# Patient Record
Sex: MALE | Race: WHITE | NOT HISPANIC OR LATINO | Employment: PART TIME | URBAN - METROPOLITAN AREA
[De-identification: names, ages, dates, MRNs, and addresses within clinical notes are randomized per-mention and may not be internally consistent; named-entity substitution may affect disease eponyms.]

---

## 2023-05-11 DIAGNOSIS — M54.40 LOW BACK PAIN WITH SCIATICA, SCIATICA LATERALITY UNSPECIFIED, UNSPECIFIED BACK PAIN LATERALITY, UNSPECIFIED CHRONICITY: Primary | ICD-10-CM

## 2023-05-11 RX ORDER — GABAPENTIN 300 MG/1
300 CAPSULE ORAL NIGHTLY
Qty: 30 CAPSULE | Refills: 0 | Status: SHIPPED | OUTPATIENT
Start: 2023-05-11 | End: 2023-06-02 | Stop reason: SDUPTHER

## 2023-05-11 RX ORDER — GABAPENTIN 300 MG/1
300 CAPSULE ORAL NIGHTLY
COMMUNITY
Start: 2021-10-11 | End: 2023-05-11 | Stop reason: SDUPTHER

## 2023-05-17 PROBLEM — R06.2 WHEEZING: Status: ACTIVE | Noted: 2023-05-17

## 2023-05-17 PROBLEM — E78.00 LOW-DENSITY-LIPOID-TYPE (LDL) HYPERLIPOPROTEINEMIA: Status: ACTIVE | Noted: 2023-05-17

## 2023-05-17 PROBLEM — M54.50 LOWER BACK PAIN: Status: ACTIVE | Noted: 2023-05-17

## 2023-05-17 PROBLEM — K22.70 BARRETT'S ESOPHAGUS WITHOUT DYSPLASIA: Status: ACTIVE | Noted: 2023-05-17

## 2023-05-17 PROBLEM — I20.0 UNSTABLE ANGINA (MULTI): Status: ACTIVE | Noted: 2023-05-17

## 2023-05-17 PROBLEM — E78.5 DYSLIPIDEMIA: Status: ACTIVE | Noted: 2023-05-17

## 2023-05-17 PROBLEM — B02.9 SHINGLES: Status: RESOLVED | Noted: 2023-05-17 | Resolved: 2023-05-17

## 2023-05-17 PROBLEM — H61.21 IMPACTED CERUMEN OF RIGHT EAR: Status: ACTIVE | Noted: 2023-05-17

## 2023-05-17 PROBLEM — J30.9 ALLERGIC RHINITIS: Status: ACTIVE | Noted: 2023-05-17

## 2023-05-17 PROBLEM — G62.9 NEUROPATHY: Status: ACTIVE | Noted: 2023-05-17

## 2023-05-17 PROBLEM — R94.31 ABNORMAL EKG: Status: ACTIVE | Noted: 2023-05-17

## 2023-05-17 PROBLEM — R53.83 FATIGUE: Status: ACTIVE | Noted: 2023-05-17

## 2023-05-17 PROBLEM — U07.1 COVID-19: Status: RESOLVED | Noted: 2023-05-17 | Resolved: 2023-05-17

## 2023-05-17 PROBLEM — N20.0 BILATERAL NEPHROLITHIASIS: Status: ACTIVE | Noted: 2023-05-17

## 2023-05-17 PROBLEM — R00.2 PALPITATIONS: Status: ACTIVE | Noted: 2023-05-17

## 2023-05-17 PROBLEM — M25.532 LEFT WRIST PAIN: Status: ACTIVE | Noted: 2023-05-17

## 2023-05-17 PROBLEM — R73.01 ABNORMAL FASTING GLUCOSE: Status: ACTIVE | Noted: 2023-05-17

## 2023-05-17 PROBLEM — S67.21XA: Status: RESOLVED | Noted: 2023-05-17 | Resolved: 2023-05-17

## 2023-05-17 PROBLEM — M19.90 ARTHRITIS: Status: ACTIVE | Noted: 2023-05-17

## 2023-05-17 PROBLEM — E55.9 VITAMIN D DEFICIENCY: Status: ACTIVE | Noted: 2023-05-17

## 2023-05-17 PROBLEM — I10 HYPERTENSION: Status: ACTIVE | Noted: 2023-05-17

## 2023-05-17 PROBLEM — G56.00 CARPAL TUNNEL SYNDROME: Status: ACTIVE | Noted: 2023-05-17

## 2023-05-17 PROBLEM — N20.1 LEFT URETERAL CALCULUS: Status: ACTIVE | Noted: 2023-05-17

## 2023-05-17 PROBLEM — S60.221A CONTUSION OF RIGHT HAND: Status: RESOLVED | Noted: 2023-05-17 | Resolved: 2023-05-17

## 2023-05-17 RX ORDER — EZETIMIBE 10 MG/1
10 TABLET ORAL DAILY
COMMUNITY
End: 2024-03-18 | Stop reason: SDUPTHER

## 2023-05-17 RX ORDER — OMEPRAZOLE 40 MG/1
40 CAPSULE, DELAYED RELEASE ORAL 2 TIMES DAILY
COMMUNITY
End: 2023-12-07 | Stop reason: SDUPTHER

## 2023-05-17 RX ORDER — LISINOPRIL 5 MG/1
5 TABLET ORAL DAILY
COMMUNITY
End: 2023-07-28 | Stop reason: SDUPTHER

## 2023-05-17 RX ORDER — NITROGLYCERIN 0.4 MG/1
0.4 TABLET SUBLINGUAL
COMMUNITY
Start: 2022-09-30

## 2023-05-22 LAB
ALANINE AMINOTRANSFERASE (SGPT) (U/L) IN SER/PLAS: 15 U/L (ref 10–52)
ALBUMIN (G/DL) IN SER/PLAS: 4.2 G/DL (ref 3.4–5)
ALKALINE PHOSPHATASE (U/L) IN SER/PLAS: 59 U/L (ref 33–136)
ANION GAP IN SER/PLAS: 11 MMOL/L (ref 10–20)
ASPARTATE AMINOTRANSFERASE (SGOT) (U/L) IN SER/PLAS: 18 U/L (ref 9–39)
BILIRUBIN TOTAL (MG/DL) IN SER/PLAS: 0.5 MG/DL (ref 0–1.2)
CALCIUM (MG/DL) IN SER/PLAS: 9 MG/DL (ref 8.6–10.3)
CARBON DIOXIDE, TOTAL (MMOL/L) IN SER/PLAS: 28 MMOL/L (ref 21–32)
CHLORIDE (MMOL/L) IN SER/PLAS: 107 MMOL/L (ref 98–107)
CHOLESTEROL (MG/DL) IN SER/PLAS: 199 MG/DL (ref 0–199)
CHOLESTEROL IN HDL (MG/DL) IN SER/PLAS: 59.2 MG/DL
CHOLESTEROL/HDL RATIO: 3.4
CREATININE (MG/DL) IN SER/PLAS: 0.9 MG/DL (ref 0.5–1.3)
ESTIMATED AVERAGE GLUCOSE FOR HBA1C: 126 MG/DL
GFR MALE: >90 ML/MIN/1.73M2
GLUCOSE (MG/DL) IN SER/PLAS: 91 MG/DL (ref 74–99)
HEMOGLOBIN A1C/HEMOGLOBIN TOTAL IN BLOOD: 6 %
LDL: 120 MG/DL (ref 0–99)
POTASSIUM (MMOL/L) IN SER/PLAS: 4.3 MMOL/L (ref 3.5–5.3)
PROTEIN TOTAL: 6.3 G/DL (ref 6.4–8.2)
SODIUM (MMOL/L) IN SER/PLAS: 142 MMOL/L (ref 136–145)
TRIGLYCERIDE (MG/DL) IN SER/PLAS: 98 MG/DL (ref 0–149)
UREA NITROGEN (MG/DL) IN SER/PLAS: 23 MG/DL (ref 6–23)
VLDL: 20 MG/DL (ref 0–40)

## 2023-06-02 ENCOUNTER — OFFICE VISIT (OUTPATIENT)
Dept: PRIMARY CARE | Facility: CLINIC | Age: 68
End: 2023-06-02
Payer: MEDICARE

## 2023-06-02 VITALS
BODY MASS INDEX: 22.22 KG/M2 | DIASTOLIC BLOOD PRESSURE: 66 MMHG | HEIGHT: 68 IN | HEART RATE: 78 BPM | OXYGEN SATURATION: 97 % | WEIGHT: 146.6 LBS | RESPIRATION RATE: 18 BRPM | SYSTOLIC BLOOD PRESSURE: 122 MMHG

## 2023-06-02 DIAGNOSIS — E55.9 VITAMIN D DEFICIENCY: ICD-10-CM

## 2023-06-02 DIAGNOSIS — Z13.29 SCREENING FOR HYPOTHYROIDISM: ICD-10-CM

## 2023-06-02 DIAGNOSIS — Z12.5 SCREENING PSA (PROSTATE SPECIFIC ANTIGEN): ICD-10-CM

## 2023-06-02 DIAGNOSIS — E53.8 VITAMIN B 12 DEFICIENCY: ICD-10-CM

## 2023-06-02 DIAGNOSIS — R73.03 PREDIABETES: ICD-10-CM

## 2023-06-02 DIAGNOSIS — I10 PRIMARY HYPERTENSION: ICD-10-CM

## 2023-06-02 DIAGNOSIS — M54.40 LOW BACK PAIN WITH SCIATICA, SCIATICA LATERALITY UNSPECIFIED, UNSPECIFIED BACK PAIN LATERALITY, UNSPECIFIED CHRONICITY: Primary | ICD-10-CM

## 2023-06-02 DIAGNOSIS — E78.5 DYSLIPIDEMIA: ICD-10-CM

## 2023-06-02 PROCEDURE — 1160F RVW MEDS BY RX/DR IN RCRD: CPT

## 2023-06-02 PROCEDURE — 3074F SYST BP LT 130 MM HG: CPT

## 2023-06-02 PROCEDURE — 1159F MED LIST DOCD IN RCRD: CPT

## 2023-06-02 PROCEDURE — 99214 OFFICE O/P EST MOD 30 MIN: CPT

## 2023-06-02 PROCEDURE — 3078F DIAST BP <80 MM HG: CPT

## 2023-06-02 PROCEDURE — 1036F TOBACCO NON-USER: CPT

## 2023-06-02 RX ORDER — GABAPENTIN 300 MG/1
300 CAPSULE ORAL NIGHTLY
Qty: 90 CAPSULE | Refills: 1 | Status: SHIPPED | OUTPATIENT
Start: 2023-06-02 | End: 2023-12-07 | Stop reason: SDUPTHER

## 2023-06-02 ASSESSMENT — ENCOUNTER SYMPTOMS
ENDOCRINE NEGATIVE: 1
PSYCHIATRIC NEGATIVE: 1
RESPIRATORY NEGATIVE: 1
GASTROINTESTINAL NEGATIVE: 1
MUSCULOSKELETAL NEGATIVE: 1
ALLERGIC/IMMUNOLOGIC NEGATIVE: 1
CONSTITUTIONAL NEGATIVE: 1
NEUROLOGICAL NEGATIVE: 1
EYES NEGATIVE: 1
HEMATOLOGIC/LYMPHATIC NEGATIVE: 1
CARDIOVASCULAR NEGATIVE: 1

## 2023-06-02 ASSESSMENT — PATIENT HEALTH QUESTIONNAIRE - PHQ9
SUM OF ALL RESPONSES TO PHQ9 QUESTIONS 1 AND 2: 0
1. LITTLE INTEREST OR PLEASURE IN DOING THINGS: NOT AT ALL
2. FEELING DOWN, DEPRESSED OR HOPELESS: NOT AT ALL

## 2023-06-02 NOTE — PROGRESS NOTES
3 month follow up - genaro patient.     Patient states he usually goes 6 months between appts.      No health concerns.   He thinks he needs refill on gabapentin today.

## 2023-06-02 NOTE — PATIENT INSTRUCTIONS
6 month follow up medicare wellness with fasting labs.    Follow up with cardiology as scheduled.     Discuss screening exams at next appointment.

## 2023-06-02 NOTE — PROGRESS NOTES
"Subjective   Patient ID: Eugene Patrick is a 68 y.o. male who presents for Follow-up.    HPI a 68-year-old male with past medical history of hyperlipidemia, chronic neuropathy, hypertension, pseudoaneurysms and history of cardiovascular disease and myocardial infarctions in the family arrives to the clinic with chief complaint of 3-month follow-up.  At his last appointment, he was given directions by his previous PCP to complete blood work and to establish with his new PCP today.  He reports that he completed his blood work, like to review them today.  Other than this, the patient would like to let us know that he completed his colonoscopy and EGD at the beginning of this year with no abnormal results.  He completes an EGD every 3 years due to his history of being a  and high risk for esophageal cancer due to smoking inhalation.  Other than this, he does use gabapentin 300 mg oral tablet nightly for his neuropathy and needs a refill on this.  The patient also follows cardiology for his family history of cardiovascular disease and personal history of hypertension, and cholesterol.  He underwent a left and right heart catheterization this year with unremarkable results.  Patient denies any chest pain, shortness of breath, hip pain, fevers, chills, COVID-like symptoms.    Review of Systems   Constitutional: Negative.    HENT: Negative.     Eyes: Negative.    Respiratory: Negative.     Cardiovascular: Negative.    Gastrointestinal: Negative.    Endocrine: Negative.    Genitourinary: Negative.    Musculoskeletal: Negative.    Skin: Negative.    Allergic/Immunologic: Negative.    Neurological: Negative.    Hematological: Negative.    Psychiatric/Behavioral: Negative.     All other systems reviewed and are negative.      Objective   /66 (BP Location: Right arm, BP Cuff Size: Adult)   Pulse 78   Resp 18   Ht 1.727 m (5' 8\")   Wt 66.5 kg (146 lb 9.6 oz)   SpO2 97%   BMI 22.29 kg/m²     Physical " Exam  Vitals and nursing note reviewed.   Constitutional:       Appearance: Normal appearance.   HENT:      Head: Normocephalic and atraumatic.      Right Ear: Tympanic membrane normal.      Left Ear: Tympanic membrane normal.      Nose: Nose normal.      Mouth/Throat:      Mouth: Mucous membranes are moist.      Pharynx: Oropharynx is clear.   Eyes:      Extraocular Movements: Extraocular movements intact.      Conjunctiva/sclera: Conjunctivae normal.      Pupils: Pupils are equal, round, and reactive to light.   Cardiovascular:      Rate and Rhythm: Normal rate and regular rhythm.   Pulmonary:      Effort: Pulmonary effort is normal.      Breath sounds: Normal breath sounds.   Abdominal:      General: Bowel sounds are normal.      Palpations: Abdomen is soft.   Musculoskeletal:         General: Normal range of motion.      Cervical back: Normal range of motion and neck supple.   Skin:     General: Skin is warm.      Capillary Refill: Capillary refill takes less than 2 seconds.   Neurological:      General: No focal deficit present.      Mental Status: He is alert and oriented to person, place, and time. Mental status is at baseline.   Psychiatric:         Mood and Affect: Mood normal.         Behavior: Behavior normal.         Thought Content: Thought content normal.         Judgment: Judgment normal.         Assessment/Plan   Problem List Items Addressed This Visit          Endocrine/Metabolic    Vitamin D deficiency    Relevant Orders    Vitamin D, Total       Other    Dyslipidemia - Primary    Relevant Orders    Lipid Panel    Lower back pain    Relevant Medications    gabapentin (Neurontin) 300 mg capsule     Other Visit Diagnoses       Vitamin B 12 deficiency        Relevant Orders    Vitamin B12    Screening for hypothyroidism        Relevant Orders    TSH with reflex to Free T4 if abnormal    Screening PSA (prostate specific antigen)        Relevant Orders    PSA, Total and Free    Prediabetes         Relevant Orders    CBC    Comprehensive Metabolic Panel    Hemoglobin A1C          It was a pleasure seeing you in the office today.     I have ordered blood work for you to complete prior to your next appointment. Your next appointment will be a medicare wellness exam in 6th months    Colonoscopy and EGD completed this year. Repeat colonoscopy in 2028 and EGD in 2026. Hx of triplett's esophagus     Discuss screening exams at your next appointment.     Continue all medications as Rx.    I also advised you to follow low fat diet and exercise for at least 30 minutes daily.    Anticipatory guidance, age appropriate vaccines, screening exams, health promotion and prevention discussed.    This document was generated using the assistance of voice recognition software. If there are any errors of spelling, grammar, syntax, or meaning; please feel free to contact me directly for clarification.

## 2023-07-28 DIAGNOSIS — I10 PRIMARY HYPERTENSION: Primary | ICD-10-CM

## 2023-07-31 RX ORDER — LISINOPRIL 5 MG/1
5 TABLET ORAL DAILY
Qty: 90 TABLET | Refills: 3 | Status: SHIPPED | OUTPATIENT
Start: 2023-07-31 | End: 2023-12-07 | Stop reason: SDUPTHER

## 2023-11-09 ENCOUNTER — OFFICE VISIT (OUTPATIENT)
Dept: CARDIOLOGY | Facility: CLINIC | Age: 68
End: 2023-11-09
Payer: MEDICARE

## 2023-11-09 VITALS
WEIGHT: 140 LBS | BODY MASS INDEX: 21.22 KG/M2 | SYSTOLIC BLOOD PRESSURE: 120 MMHG | HEIGHT: 68 IN | DIASTOLIC BLOOD PRESSURE: 70 MMHG | HEART RATE: 70 BPM

## 2023-11-09 DIAGNOSIS — E78.5 DYSLIPIDEMIA: Primary | ICD-10-CM

## 2023-11-09 PROCEDURE — 1125F AMNT PAIN NOTED PAIN PRSNT: CPT | Performed by: INTERNAL MEDICINE

## 2023-11-09 PROCEDURE — 1159F MED LIST DOCD IN RCRD: CPT | Performed by: INTERNAL MEDICINE

## 2023-11-09 PROCEDURE — 93000 ELECTROCARDIOGRAM COMPLETE: CPT | Performed by: INTERNAL MEDICINE

## 2023-11-09 PROCEDURE — 3078F DIAST BP <80 MM HG: CPT | Performed by: INTERNAL MEDICINE

## 2023-11-09 PROCEDURE — 1036F TOBACCO NON-USER: CPT | Performed by: INTERNAL MEDICINE

## 2023-11-09 PROCEDURE — 99213 OFFICE O/P EST LOW 20 MIN: CPT | Performed by: INTERNAL MEDICINE

## 2023-11-09 PROCEDURE — 1160F RVW MEDS BY RX/DR IN RCRD: CPT | Performed by: INTERNAL MEDICINE

## 2023-11-09 PROCEDURE — 3074F SYST BP LT 130 MM HG: CPT | Performed by: INTERNAL MEDICINE

## 2023-11-09 RX ORDER — ZOSTER VACCINE RECOMBINANT, ADJUVANTED 50 MCG/0.5
KIT INTRAMUSCULAR
COMMUNITY
Start: 2023-01-04

## 2023-11-09 ASSESSMENT — ENCOUNTER SYMPTOMS
OCCASIONAL FEELINGS OF UNSTEADINESS: 0
DEPRESSION: 0
PALPITATIONS: 1
LOSS OF SENSATION IN FEET: 0

## 2023-11-09 NOTE — LETTER
"November 9, 2023     Alec Medina DO  [address]  [address]  [facility] [address]    Patient: Eugene Patrick   YOB: 1955   Date of Visit: 11/9/2023       Dear Dr. Alec Medina DO:    Thank you for referring Eugene Patrick to me for evaluation. Below are my notes for this consultation.  If you have questions, please do not hesitate to call me. I look forward to following your patient along with you.       Sincerely,     Kwame Handy MD      CC: No Recipients  ______________________________________________________________________________________    Counseling:  The patient was counseled regarding diagnostic results, instructions for management, risk factor reductions, prognosis, patient and family education, impressions, risks and benefits of treatment options and importance of compliance with treatment.      Chief Complaint:   The patient presents today for annual followup of CAD.    History Of Present Illness:    Eugene Patrick is a 67 year old male patient who presents today for annual followup of CAD. His PMH is significant for Cordova's esophagus, bilateral nephrolithiasis, h/o COVID infection, HTN, h/o rheumatic fever at 7 years of age, LDL hyperlipoproteinemia, neuropathy, and h/o shingles. Over the past year, the patient states that he has done well from a cardiac standpoint. He denies any CP, chest discomfort or SOB. He reports occasional palpitations. The patient keeps himself active, performing regular physical exercise with no reported difficulties. He states that his BP is stable at home. EKG today shows NSR with no acute changes. The patient is compliant with his prescribed medications. Per the patient, he is being scheduled for shoulder surgery and requires cardiac clearance.     Last Recorded Vitals:  Vitals:    11/09/23 1553   BP: 142/76   BP Location: Left arm   Pulse: 70   Weight: 63.5 kg (140 lb)   Height: 1.727 m (5' 8\")       Past Surgical History:  He has a past surgical " history that includes Other surgical history (01/20/2020); Other surgical history (01/20/2020); Other surgical history (01/20/2020); Tonsillectomy (02/23/2016); and Hernia repair (02/23/2016).      Social History:  He reports that he has never smoked. He has never used smokeless tobacco. He reports that he does not use drugs. No history on file for alcohol use.    Family History:  No family history on file.     Allergies:  Statins-hmg-coa reductase inhibitors    Outpatient Medications:  Current Outpatient Medications   Medication Instructions   • ezetimibe (ZETIA) 10 mg, oral, Daily   • gabapentin (NEURONTIN) 300 mg, oral, Nightly   • lisinopril 5 mg, oral, Daily   • nitroglycerin (NITROSTAT) 0.4 mg, sublingual, TAKE ONE (1) TABLET UNDER THE TONGUE FOUR (4) TIMES A DAY - KEEP IN ORIGINAL GLASS BOTTLE   • omeprazole (PRILOSEC) 40 mg, oral, 2 times daily   • Shingrix, PF, 50 mcg/0.5 mL vaccine      Review of Systems   Cardiovascular:  Positive for palpitations (occasional).   All other systems reviewed and are negative.     Physical Exam:  Constitutional:       Appearance: Healthy appearance. Not in distress.   Neck:      Vascular: No JVR. JVD normal.   Pulmonary:      Effort: Pulmonary effort is normal.      Breath sounds: Normal breath sounds. No wheezing. No rhonchi. No rales.   Chest:      Chest wall: Not tender to palpatation.   Cardiovascular:      PMI at left midclavicular line. Normal rate. Regular rhythm. Normal S1. Normal S2.       Murmurs: There is no murmur.      No gallop.  No click. No rub.   Pulses:     Intact distal pulses.   Edema:     Peripheral edema absent.   Abdominal:      General: Bowel sounds are normal.      Palpations: Abdomen is soft.      Tenderness: There is no abdominal tenderness.   Musculoskeletal: Normal range of motion.         General: No tenderness. Skin:     General: Skin is warm and dry.   Neurological:      General: No focal deficit present.      Mental Status: Alert and oriented  to person, place and time.          Last Labs:  CBC -  Lab Results   Component Value Date    WBC 5.9 09/30/2022    HGB 14.8 09/30/2022    HCT 44.0 09/30/2022    MCV 91 09/30/2022     (L) 09/30/2022       CMP -  Lab Results   Component Value Date    CALCIUM 9.0 05/22/2023    PHOS 1.9 (L) 11/07/2019    PROT 6.3 (L) 05/22/2023    ALBUMIN 4.2 05/22/2023    AST 18 05/22/2023    ALT 15 05/22/2023    ALKPHOS 59 05/22/2023    BILITOT 0.5 05/22/2023       LIPID PANEL -   Lab Results   Component Value Date    CHOL 199 05/22/2023    TRIG 98 05/22/2023    HDL 59.2 05/22/2023    CHHDL 3.4 05/22/2023    LDLF 120 (H) 05/22/2023    VLDL 20 05/22/2023       RENAL FUNCTION PANEL -   Lab Results   Component Value Date    GLUCOSE 91 05/22/2023     05/22/2023    K 4.3 05/22/2023     05/22/2023    CO2 28 05/22/2023    ANIONGAP 11 05/22/2023    BUN 23 05/22/2023    CREATININE 0.90 05/22/2023    GFRMALE >90 05/22/2023    CALCIUM 9.0 05/22/2023    PHOS 1.9 (L) 11/07/2019    ALBUMIN 4.2 05/22/2023        Lab Results   Component Value Date    HGBA1C 6.0 (A) 05/22/2023       Last Cardiology Tests:  11/30/2022 - Arterial Duplex U/S  Right Upper Arterial: The right radial artery is patent with triphasic waveforms. No evidence of disease or pseudoaneurysm noted in the right upper extremity. Followed the radial artery with ultrasound as far as it could until the bony prominences gave way to shadowing. Patients area of concern was a small lump near base of hand near the thumb.     10/07/2022 - Cardiac Catheterization (LH)  1. Mild CAD in right-dominant circulation.  2. Normal left-sided filling pressure (LVEDP 7 mmHg).  3. No gradient on pull-back to suggest aortic stenosis.     11/06/2020 - CT Cardiac Scoring  1. Total: 160 as compared with 20 previously.  2. The visualized mid/lower ascending thoracic aorta measures 2.9 cm in diameter. The heart is normal in size. No pericardial effusion is present.  3. No gross evidence of  mediastinal or hilar lymphadenopathy or masses is identified. The visualized segments of the lungs are hyperinflated. Punctate indeterminate nodule in the right middle lobe measuring 4 mm.  4. The visualized subdiaphragmatic structures appear intact.     2020 - Exercise Stress Test  1. No clinical or electrocardiographic evidence for ischemia at maximal workload.  2. The adequate level of stress was achieved.       Assessment/Plan  1) Mild CAD by cath  D/W patient aggressive secondary risk factor modification to achieve LDL goal 60-70  On Zetia 10 mg daily, lisinopril 5 mg daily  Lipid panel 2023 with LDL of 120; not at goal  Doing well - denies CP, chest discomfort or SOB  Reports occasional palpitations  Is active with no reported difficulties  BP stable   EKG shows NSR with no acute changes  Start ASA 81 mg daily   Start Nexletol 180 mg daily  Being scheduled for shoulder surgery - low risk for planned surgery, clearance sent to surgeon  Check lipid panel, CMP and uric acid in 3 months  F/U 1 year      2) Pulsatile Swelling in Right Hand Dorsum - ? IV Site  Arterial duplex 2022 with no evidence of disease or pseudoaneurysm in the right upper extremity       Scribe Attestation  By signing my name below, I, Shellie Lynn   attest that this documentation has been prepared under the direction and in the presence of Kwame Handy MD.                              [address] [address]                   Phone# [phone]              Fax# [phone]      Date: 23    RE: Eugene Patrick            : 1955       Surgical/Procedural Clearance for:  Shoulder surgery  Patient is at: LOW cardiovascular risk for this LOW risk procedure.           Can hold Antiplatelet ASA for 3 days prior      N/A hold Anticoagulant                 Is further cardiac workup is needed prior to the procedure?  No     Patient should continue Beta Blocker in the  perioperative period.  N/A     Patient should resume antiplatelet/anticoagulation as soon as cleared by surgeon/procedure physician.  Yes       Thank You,    11/09/23 at 4:12 PM - Kwame Handy MD

## 2023-11-09 NOTE — PROGRESS NOTES
"Counseling:  The patient was counseled regarding diagnostic results, instructions for management, risk factor reductions, prognosis, patient and family education, impressions, risks and benefits of treatment options and importance of compliance with treatment.      Chief Complaint:   The patient presents today for annual followup of CAD.    History Of Present Illness:    Eugene Patrick is a 67 year old male patient who presents today for annual followup of CAD. His PMH is significant for Cordova's esophagus, bilateral nephrolithiasis, h/o COVID infection, HTN, h/o rheumatic fever at 7 years of age, LDL hyperlipoproteinemia, neuropathy, and h/o shingles. Over the past year, the patient states that he has done well from a cardiac standpoint. He denies any CP, chest discomfort or SOB. He reports occasional palpitations. The patient keeps himself active, performing regular physical exercise with no reported difficulties. He states that his BP is stable at home. EKG today shows NSR with no acute changes. The patient is compliant with his prescribed medications. Per the patient, he is being scheduled for shoulder surgery and requires cardiac clearance.     Last Recorded Vitals:  Vitals:    11/09/23 1553   BP: 142/76   BP Location: Left arm   Pulse: 70   Weight: 63.5 kg (140 lb)   Height: 1.727 m (5' 8\")       Past Surgical History:  He has a past surgical history that includes Other surgical history (01/20/2020); Other surgical history (01/20/2020); Other surgical history (01/20/2020); Tonsillectomy (02/23/2016); and Hernia repair (02/23/2016).      Social History:  He reports that he has never smoked. He has never used smokeless tobacco. He reports that he does not use drugs. No history on file for alcohol use.    Family History:  No family history on file.     Allergies:  Statins-hmg-coa reductase inhibitors    Outpatient Medications:  Current Outpatient Medications   Medication Instructions    ezetimibe (ZETIA) 10 mg, " oral, Daily    gabapentin (NEURONTIN) 300 mg, oral, Nightly    lisinopril 5 mg, oral, Daily    nitroglycerin (NITROSTAT) 0.4 mg, sublingual, TAKE ONE (1) TABLET UNDER THE TONGUE FOUR (4) TIMES A DAY - KEEP IN ORIGINAL GLASS BOTTLE    omeprazole (PRILOSEC) 40 mg, oral, 2 times daily    Shingrix, PF, 50 mcg/0.5 mL vaccine      Review of Systems   Cardiovascular:  Positive for palpitations (occasional).   All other systems reviewed and are negative.     Physical Exam:  Constitutional:       Appearance: Healthy appearance. Not in distress.   Neck:      Vascular: No JVR. JVD normal.   Pulmonary:      Effort: Pulmonary effort is normal.      Breath sounds: Normal breath sounds. No wheezing. No rhonchi. No rales.   Chest:      Chest wall: Not tender to palpatation.   Cardiovascular:      PMI at left midclavicular line. Normal rate. Regular rhythm. Normal S1. Normal S2.       Murmurs: There is no murmur.      No gallop.  No click. No rub.   Pulses:     Intact distal pulses.   Edema:     Peripheral edema absent.   Abdominal:      General: Bowel sounds are normal.      Palpations: Abdomen is soft.      Tenderness: There is no abdominal tenderness.   Musculoskeletal: Normal range of motion.         General: No tenderness. Skin:     General: Skin is warm and dry.   Neurological:      General: No focal deficit present.      Mental Status: Alert and oriented to person, place and time.          Last Labs:  CBC -  Lab Results   Component Value Date    WBC 5.9 09/30/2022    HGB 14.8 09/30/2022    HCT 44.0 09/30/2022    MCV 91 09/30/2022     (L) 09/30/2022       CMP -  Lab Results   Component Value Date    CALCIUM 9.0 05/22/2023    PHOS 1.9 (L) 11/07/2019    PROT 6.3 (L) 05/22/2023    ALBUMIN 4.2 05/22/2023    AST 18 05/22/2023    ALT 15 05/22/2023    ALKPHOS 59 05/22/2023    BILITOT 0.5 05/22/2023       LIPID PANEL -   Lab Results   Component Value Date    CHOL 199 05/22/2023    TRIG 98 05/22/2023    HDL 59.2 05/22/2023     CHHDL 3.4 05/22/2023    LDLF 120 (H) 05/22/2023    VLDL 20 05/22/2023       RENAL FUNCTION PANEL -   Lab Results   Component Value Date    GLUCOSE 91 05/22/2023     05/22/2023    K 4.3 05/22/2023     05/22/2023    CO2 28 05/22/2023    ANIONGAP 11 05/22/2023    BUN 23 05/22/2023    CREATININE 0.90 05/22/2023    GFRMALE >90 05/22/2023    CALCIUM 9.0 05/22/2023    PHOS 1.9 (L) 11/07/2019    ALBUMIN 4.2 05/22/2023        Lab Results   Component Value Date    HGBA1C 6.0 (A) 05/22/2023       Last Cardiology Tests:  11/30/2022 - Arterial Duplex U/S  Right Upper Arterial: The right radial artery is patent with triphasic waveforms. No evidence of disease or pseudoaneurysm noted in the right upper extremity. Followed the radial artery with ultrasound as far as it could until the bony prominences gave way to shadowing. Patients area of concern was a small lump near base of hand near the thumb.     10/07/2022 - Cardiac Catheterization (LH)  1. Mild CAD in right-dominant circulation.  2. Normal left-sided filling pressure (LVEDP 7 mmHg).  3. No gradient on pull-back to suggest aortic stenosis.     11/06/2020 - CT Cardiac Scoring  1. Total: 160 as compared with 20 previously.  2. The visualized mid/lower ascending thoracic aorta measures 2.9 cm in diameter. The heart is normal in size. No pericardial effusion is present.  3. No gross evidence of mediastinal or hilar lymphadenopathy or masses is identified. The visualized segments of the lungs are hyperinflated. Punctate indeterminate nodule in the right middle lobe measuring 4 mm.  4. The visualized subdiaphragmatic structures appear intact.     07/24/2020 - Exercise Stress Test  1. No clinical or electrocardiographic evidence for ischemia at maximal workload.  2. The adequate level of stress was achieved.       Assessment/Plan   1) Mild CAD by cath  D/W patient aggressive secondary risk factor modification to achieve LDL goal 60-70  On Zetia 10 mg daily, lisinopril 5  mg daily  Lipid panel 05/22/2023 with LDL of 120; not at goal  Doing well - denies CP, chest discomfort or SOB  Reports occasional palpitations  Is active with no reported difficulties  BP stable   EKG shows NSR with no acute changes  Start ASA 81 mg daily   Start Nexletol 180 mg daily  Being scheduled for shoulder surgery - low risk for planned surgery, clearance sent to surgeon  Check lipid panel, CMP and uric acid in 3 months  F/U 1 year      2) Pulsatile Swelling in Right Hand Dorsum - ? IV Site  Arterial duplex 11/2022 with no evidence of disease or pseudoaneurysm in the right upper extremity       Scribe Attestation  By signing my name below, I, Shellie Lynn   attest that this documentation has been prepared under the direction and in the presence of Kwame Handy MD.

## 2023-11-09 NOTE — PATIENT INSTRUCTIONS
"Dr. Handy has recommended starting a baby aspirin (81 mg) daily.   Your \"bad\" cholesterol is currently elevated at 120, with your goal being 60-70. For this, Dr. Handy has prescribed Nexletol (bempedoic acid) 180 mg once daily. A prescription has been sent to your pharmacy.   Continue all other medications as prescribed.   Repeat blood work in 3 months. You will be notified of the results once they become available/  Followup with Dr. Hnady in 1 year, sooner should any issues or concerns arise before then.     If you have any questions or cardiac concerns, please call our office at [phone].     "

## 2023-11-17 DIAGNOSIS — E78.5 DYSLIPIDEMIA: Primary | ICD-10-CM

## 2023-11-17 RX ORDER — EVOLOCUMAB 140 MG/ML
140 INJECTION, SOLUTION SUBCUTANEOUS
Qty: 2 ML | Refills: 11 | Status: SHIPPED | OUTPATIENT
Start: 2023-11-17 | End: 2023-11-20

## 2023-11-17 NOTE — TELEPHONE ENCOUNTER
Bempedoic acid recently approved with insurance. I called [facility] and checked cost with prior auth approval and cost is $425 for 90 days. Will send Repatha to [facility] to see if Repatha any cheaper.

## 2023-11-17 NOTE — TELEPHONE ENCOUNTER
----- Message from Kwame Handy MD sent at 11/15/2023  5:23 PM EST -----  Regarding: RE: Medication  We can try Repatha - Tenisha prescription needs to be sent to  Pharmacy at [place]. They work with patient's insurance to approve it  ----- Message -----  From: Izabella Rodriguez  Sent: 11/15/2023   4:48 PM EST  To: Kwame Handy MD  Subject: Medication                                       Patient called to let us know that insurance will not cover the Bempodic acid you prescribed him to lower his LDL's and it is $1200 for 3 months. He cannot afford that and wanted to see if there was something else you could prescribe?

## 2023-11-18 ENCOUNTER — PHARMACY VISIT (OUTPATIENT)
Dept: PHARMACY | Facility: CLINIC | Age: 68
End: 2023-11-18
Payer: COMMERCIAL

## 2023-11-20 ENCOUNTER — TELEPHONE (OUTPATIENT)
Dept: PRIMARY CARE | Facility: CLINIC | Age: 68
End: 2023-11-20
Payer: MEDICARE

## 2023-11-20 ENCOUNTER — TELEPHONE (OUTPATIENT)
Dept: CARDIOLOGY | Facility: CLINIC | Age: 68
End: 2023-11-20
Payer: MEDICARE

## 2023-11-20 DIAGNOSIS — E78.5 DYSLIPIDEMIA: ICD-10-CM

## 2023-11-20 NOTE — TELEPHONE ENCOUNTER
----- Message from Eugene Patrick sent at 11/18/2023  4:05 PM EST -----  Regarding: [facility] prescription from Dr. Handy.  Contact: [phone]  After reading the many negative comments about the serious muscle and pain related side affects from people who had tried this medication, (repatha 140 mg) i am not going to use it. i already have enough muscle and pain related issues.

## 2023-11-20 NOTE — TELEPHONE ENCOUNTER
Dr. Handy ordered Bempedoic Acid 180 mg 1 tab PO once daily, but the copay came back $425 for 90 days. Patient asked for something different so then Dr. Handy said we could send Repatha to try.  [facility] called and patient would like to try Bempedoic acid 30 days at a time which would be $80 a month. I gave a verbal approval over the phone that it is ok to cancel Repatha and ok to go forth with Bempedoic acid, and she will renew order.

## 2023-11-21 ENCOUNTER — LAB (OUTPATIENT)
Dept: LAB | Facility: LAB | Age: 68
End: 2023-11-21
Payer: MEDICARE

## 2023-11-21 DIAGNOSIS — E55.9 VITAMIN D DEFICIENCY: ICD-10-CM

## 2023-11-21 DIAGNOSIS — E78.5 DYSLIPIDEMIA: ICD-10-CM

## 2023-11-21 DIAGNOSIS — E53.8 VITAMIN B 12 DEFICIENCY: ICD-10-CM

## 2023-11-21 DIAGNOSIS — Z12.5 SCREENING PSA (PROSTATE SPECIFIC ANTIGEN): ICD-10-CM

## 2023-11-21 DIAGNOSIS — R73.03 PREDIABETES: ICD-10-CM

## 2023-11-21 DIAGNOSIS — Z13.29 SCREENING FOR HYPOTHYROIDISM: ICD-10-CM

## 2023-11-21 LAB
25(OH)D3 SERPL-MCNC: 38 NG/ML (ref 30–100)
ALBUMIN SERPL BCP-MCNC: 4.3 G/DL (ref 3.4–5)
ALP SERPL-CCNC: 53 U/L (ref 33–136)
ALT SERPL W P-5'-P-CCNC: 16 U/L (ref 10–52)
ANION GAP SERPL CALC-SCNC: 8 MMOL/L (ref 10–20)
AST SERPL W P-5'-P-CCNC: 19 U/L (ref 9–39)
BILIRUB SERPL-MCNC: 0.6 MG/DL (ref 0–1.2)
BUN SERPL-MCNC: 21 MG/DL (ref 6–23)
CALCIUM SERPL-MCNC: 9.2 MG/DL (ref 8.6–10.3)
CHLORIDE SERPL-SCNC: 104 MMOL/L (ref 98–107)
CHOLEST SERPL-MCNC: 207 MG/DL (ref 0–199)
CHOLESTEROL/HDL RATIO: 3.7
CO2 SERPL-SCNC: 30 MMOL/L (ref 21–32)
CREAT SERPL-MCNC: 0.98 MG/DL (ref 0.5–1.3)
ERYTHROCYTE [DISTWIDTH] IN BLOOD BY AUTOMATED COUNT: 14 % (ref 11.5–14.5)
EST. AVERAGE GLUCOSE BLD GHB EST-MCNC: 131 MG/DL
GFR SERPL CREATININE-BSD FRML MDRD: 85 ML/MIN/1.73M*2
GLUCOSE SERPL-MCNC: 99 MG/DL (ref 74–99)
HBA1C MFR BLD: 6.2 %
HCT VFR BLD AUTO: 49.1 % (ref 41–52)
HDLC SERPL-MCNC: 56.5 MG/DL
HGB BLD-MCNC: 15.9 G/DL (ref 13.5–17.5)
LDLC SERPL CALC-MCNC: 130 MG/DL
MCH RBC QN AUTO: 30.2 PG (ref 26–34)
MCHC RBC AUTO-ENTMCNC: 32.4 G/DL (ref 32–36)
MCV RBC AUTO: 93 FL (ref 80–100)
NON HDL CHOLESTEROL: 151 MG/DL (ref 0–149)
NRBC BLD-RTO: 0 /100 WBCS (ref 0–0)
PLATELET # BLD AUTO: 147 X10*3/UL (ref 150–450)
POTASSIUM SERPL-SCNC: 4.2 MMOL/L (ref 3.5–5.3)
PROT SERPL-MCNC: 6.9 G/DL (ref 6.4–8.2)
RBC # BLD AUTO: 5.26 X10*6/UL (ref 4.5–5.9)
SODIUM SERPL-SCNC: 138 MMOL/L (ref 136–145)
TRIGL SERPL-MCNC: 104 MG/DL (ref 0–149)
TSH SERPL-ACNC: 3.43 MIU/L (ref 0.44–3.98)
VIT B12 SERPL-MCNC: 420 PG/ML (ref 211–911)
VLDL: 21 MG/DL (ref 0–40)
WBC # BLD AUTO: 5 X10*3/UL (ref 4.4–11.3)

## 2023-11-21 PROCEDURE — 82306 VITAMIN D 25 HYDROXY: CPT

## 2023-11-21 PROCEDURE — 82607 VITAMIN B-12: CPT

## 2023-11-21 PROCEDURE — 84154 ASSAY OF PSA FREE: CPT

## 2023-11-21 PROCEDURE — 80053 COMPREHEN METABOLIC PANEL: CPT

## 2023-11-21 PROCEDURE — 36415 COLL VENOUS BLD VENIPUNCTURE: CPT

## 2023-11-21 PROCEDURE — 84443 ASSAY THYROID STIM HORMONE: CPT

## 2023-11-21 PROCEDURE — 83036 HEMOGLOBIN GLYCOSYLATED A1C: CPT

## 2023-11-21 PROCEDURE — G0103 PSA SCREENING: HCPCS

## 2023-11-21 PROCEDURE — 80061 LIPID PANEL: CPT

## 2023-11-21 PROCEDURE — 85027 COMPLETE CBC AUTOMATED: CPT

## 2023-11-23 LAB
PSA FREE MFR SERPL: 43 %
PSA FREE SERPL-MCNC: 0.3 NG/ML
PSA SERPL IA-MCNC: 0.7 NG/ML (ref 0–4)

## 2023-12-07 ENCOUNTER — OFFICE VISIT (OUTPATIENT)
Dept: PRIMARY CARE | Facility: CLINIC | Age: 68
End: 2023-12-07
Payer: MEDICARE

## 2023-12-07 ENCOUNTER — APPOINTMENT (OUTPATIENT)
Dept: PRIMARY CARE | Facility: CLINIC | Age: 68
End: 2023-12-07
Payer: MEDICARE

## 2023-12-07 VITALS
OXYGEN SATURATION: 96 % | HEART RATE: 60 BPM | RESPIRATION RATE: 16 BRPM | DIASTOLIC BLOOD PRESSURE: 68 MMHG | WEIGHT: 150.6 LBS | BODY MASS INDEX: 22.82 KG/M2 | SYSTOLIC BLOOD PRESSURE: 114 MMHG | HEIGHT: 68 IN

## 2023-12-07 DIAGNOSIS — Z12.5 SCREENING PSA (PROSTATE SPECIFIC ANTIGEN): ICD-10-CM

## 2023-12-07 DIAGNOSIS — I10 PRIMARY HYPERTENSION: ICD-10-CM

## 2023-12-07 DIAGNOSIS — E53.8 VITAMIN B 12 DEFICIENCY: ICD-10-CM

## 2023-12-07 DIAGNOSIS — K22.70 BARRETT'S ESOPHAGUS WITHOUT DYSPLASIA: ICD-10-CM

## 2023-12-07 DIAGNOSIS — M54.40 LOW BACK PAIN WITH SCIATICA, SCIATICA LATERALITY UNSPECIFIED, UNSPECIFIED BACK PAIN LATERALITY, UNSPECIFIED CHRONICITY: ICD-10-CM

## 2023-12-07 DIAGNOSIS — E78.5 DYSLIPIDEMIA: ICD-10-CM

## 2023-12-07 DIAGNOSIS — Z01.818 PRE-OP EVALUATION: ICD-10-CM

## 2023-12-07 DIAGNOSIS — Z71.89 ADVANCE DIRECTIVE DISCUSSED WITH PATIENT: ICD-10-CM

## 2023-12-07 DIAGNOSIS — E55.9 VITAMIN D DEFICIENCY: ICD-10-CM

## 2023-12-07 DIAGNOSIS — Z00.00 MEDICARE ANNUAL WELLNESS VISIT, SUBSEQUENT: Primary | ICD-10-CM

## 2023-12-07 DIAGNOSIS — R73.03 PREDIABETES: ICD-10-CM

## 2023-12-07 PROCEDURE — 1125F AMNT PAIN NOTED PAIN PRSNT: CPT | Performed by: NURSE PRACTITIONER

## 2023-12-07 PROCEDURE — G0439 PPPS, SUBSEQ VISIT: HCPCS | Performed by: NURSE PRACTITIONER

## 2023-12-07 PROCEDURE — 1159F MED LIST DOCD IN RCRD: CPT | Performed by: NURSE PRACTITIONER

## 2023-12-07 PROCEDURE — 1160F RVW MEDS BY RX/DR IN RCRD: CPT | Performed by: NURSE PRACTITIONER

## 2023-12-07 PROCEDURE — 3074F SYST BP LT 130 MM HG: CPT | Performed by: NURSE PRACTITIONER

## 2023-12-07 PROCEDURE — 99214 OFFICE O/P EST MOD 30 MIN: CPT | Performed by: NURSE PRACTITIONER

## 2023-12-07 PROCEDURE — 3078F DIAST BP <80 MM HG: CPT | Performed by: NURSE PRACTITIONER

## 2023-12-07 PROCEDURE — 99497 ADVNCD CARE PLAN 30 MIN: CPT | Performed by: NURSE PRACTITIONER

## 2023-12-07 PROCEDURE — 1170F FXNL STATUS ASSESSED: CPT | Performed by: NURSE PRACTITIONER

## 2023-12-07 PROCEDURE — 1036F TOBACCO NON-USER: CPT | Performed by: NURSE PRACTITIONER

## 2023-12-07 RX ORDER — LISINOPRIL 5 MG/1
5 TABLET ORAL DAILY
Qty: 90 TABLET | Refills: 3 | Status: SHIPPED | OUTPATIENT
Start: 2023-12-07

## 2023-12-07 RX ORDER — OMEPRAZOLE 40 MG/1
40 CAPSULE, DELAYED RELEASE ORAL
Qty: 90 CAPSULE | Refills: 2 | Status: SHIPPED | OUTPATIENT
Start: 2023-12-07 | End: 2024-03-18 | Stop reason: SDUPTHER

## 2023-12-07 RX ORDER — GABAPENTIN 300 MG/1
300 CAPSULE ORAL NIGHTLY
Qty: 90 CAPSULE | Refills: 2 | Status: SHIPPED | OUTPATIENT
Start: 2023-12-07 | End: 2024-09-02

## 2023-12-07 ASSESSMENT — ACTIVITIES OF DAILY LIVING (ADL)
DOING_HOUSEWORK: INDEPENDENT
TAKING_MEDICATION: INDEPENDENT
GROCERY_SHOPPING: INDEPENDENT
MANAGING_FINANCES: INDEPENDENT
DRESSING: INDEPENDENT
BATHING: INDEPENDENT

## 2023-12-07 ASSESSMENT — ANXIETY QUESTIONNAIRES
2. NOT BEING ABLE TO STOP OR CONTROL WORRYING: NOT AT ALL
IF YOU CHECKED OFF ANY PROBLEMS ON THIS QUESTIONNAIRE, HOW DIFFICULT HAVE THESE PROBLEMS MADE IT FOR YOU TO DO YOUR WORK, TAKE CARE OF THINGS AT HOME, OR GET ALONG WITH OTHER PEOPLE: NOT DIFFICULT AT ALL
6. BECOMING EASILY ANNOYED OR IRRITABLE: NOT AT ALL
GAD7 TOTAL SCORE: 0
4. TROUBLE RELAXING: NOT AT ALL
5. BEING SO RESTLESS THAT IT IS HARD TO SIT STILL: NOT AT ALL
3. WORRYING TOO MUCH ABOUT DIFFERENT THINGS: NOT AT ALL
1. FEELING NERVOUS, ANXIOUS, OR ON EDGE: NOT AT ALL
7. FEELING AFRAID AS IF SOMETHING AWFUL MIGHT HAPPEN: NOT AT ALL

## 2023-12-07 ASSESSMENT — ENCOUNTER SYMPTOMS
OCCASIONAL FEELINGS OF UNSTEADINESS: 0
LOSS OF SENSATION IN FEET: 0
DEPRESSION: 0

## 2023-12-07 ASSESSMENT — PATIENT HEALTH QUESTIONNAIRE - PHQ9
2. FEELING DOWN, DEPRESSED OR HOPELESS: NOT AT ALL
1. LITTLE INTEREST OR PLEASURE IN DOING THINGS: NOT AT ALL
SUM OF ALL RESPONSES TO PHQ9 QUESTIONS 1 AND 2: 0

## 2023-12-07 NOTE — PATIENT INSTRUCTIONS
You are medically cleared for left shoulder surgery as scheduled on 12/12/23 with Dr. Medina. Continue taking all current medications as prescribed and follow up in 6 months.

## 2023-12-07 NOTE — PROGRESS NOTES
"Subjective   Reason for Visit: Eugene Patrick is an 68 y.o. male here for a Medicare Wellness visit.     Past Medical, Surgical, and Family History reviewed and updated in chart.    Reviewed all medications by prescribing practitioner or clinical pharmacist (such as prescriptions, OTCs, herbal therapies and supplements) and documented in the medical record.    This is a previous patient of  coming into [facility] care.  He is also coming in for annual Medicare wellness exam, lab results review and management of multiple chronic diseases.  His lab results are unremarkable besides his lipid panel which is abnormal with elevated LDL cholesterol at 130.  He is currently on Zetia and reports that he was just prescribed Bempedoic acid by his cardiologist. patient cannot tolerate statin due to causes myopathy.    He is also seeking medical clearance for left shoulder surgery scheduled on 12/12/2023 by Dr. Medina at [facility].  Advises that he has been cleared by his cardiologist.  He denies acute medical illness or symptoms.        Patient Care Team:  ALVIN HerreraCNP as PCP - General (Family Medicine)  ALVIN IbarraCNS as PCP - MSSP ACO Attributed Provider     Review of Systems   All other systems reviewed and are negative.      Objective   Vitals:  /68   Pulse 60   Resp 16   Ht 1.727 m (5' 8\")   Wt 68.3 kg (150 lb 9.6 oz)   SpO2 96%   BMI 22.90 kg/m²       Physical Exam  Constitutional:       Appearance: Normal appearance.   HENT:      Head: Normocephalic and atraumatic.      Right Ear: External ear normal.      Left Ear: External ear normal.      Nose: Nose normal.      Mouth/Throat:      Mouth: Mucous membranes are moist.   Cardiovascular:      Rate and Rhythm: Normal rate and regular rhythm.      Pulses: Normal pulses.      Heart sounds: Normal heart sounds.   Pulmonary:      Effort: Pulmonary effort is normal.      Breath sounds: Normal breath sounds.   Abdominal:      " General: Abdomen is flat. Bowel sounds are normal.      Palpations: Abdomen is soft.   Musculoskeletal:      Cervical back: Neck supple.   Skin:     General: Skin is warm and dry.   Neurological:      General: No focal deficit present.      Mental Status: He is alert and oriented to person, place, and time.   Psychiatric:         Mood and Affect: Mood normal.         Behavior: Behavior normal.         Thought Content: Thought content normal.         Judgment: Judgment normal.         Assessment/Plan   Problem List Items Addressed This Visit       Dyslipidemia    Hypertension    Lower back pain    Vitamin D deficiency    Vitamin B 12 deficiency    Screening PSA (prostate specific antigen)    Prediabetes     Other Visit Diagnoses       Routine general medical examination at health care facility    -  Primary    Screening for hypothyroidism

## 2023-12-21 ENCOUNTER — LAB (OUTPATIENT)
Dept: LAB | Facility: LAB | Age: 68
End: 2023-12-21
Payer: MEDICARE

## 2023-12-21 DIAGNOSIS — E78.5 DYSLIPIDEMIA: ICD-10-CM

## 2023-12-21 LAB
ALBUMIN SERPL BCP-MCNC: 4.2 G/DL (ref 3.4–5)
ALP SERPL-CCNC: 44 U/L (ref 33–136)
ALT SERPL W P-5'-P-CCNC: 18 U/L (ref 10–52)
ANION GAP SERPL CALC-SCNC: 8 MMOL/L (ref 10–20)
AST SERPL W P-5'-P-CCNC: 21 U/L (ref 9–39)
BILIRUB SERPL-MCNC: 0.7 MG/DL (ref 0–1.2)
BUN SERPL-MCNC: 32 MG/DL (ref 6–23)
CALCIUM SERPL-MCNC: 9.4 MG/DL (ref 8.6–10.3)
CHLORIDE SERPL-SCNC: 102 MMOL/L (ref 98–107)
CHOLEST SERPL-MCNC: 126 MG/DL (ref 0–199)
CHOLESTEROL/HDL RATIO: 2.9
CO2 SERPL-SCNC: 32 MMOL/L (ref 21–32)
CREAT SERPL-MCNC: 0.98 MG/DL (ref 0.5–1.3)
GFR SERPL CREATININE-BSD FRML MDRD: 85 ML/MIN/1.73M*2
GLUCOSE SERPL-MCNC: 92 MG/DL (ref 74–99)
HDLC SERPL-MCNC: 43 MG/DL
LDLC SERPL CALC-MCNC: 48 MG/DL
NON HDL CHOLESTEROL: 83 MG/DL (ref 0–149)
POTASSIUM SERPL-SCNC: 4.3 MMOL/L (ref 3.5–5.3)
PROT SERPL-MCNC: 6.8 G/DL (ref 6.4–8.2)
SODIUM SERPL-SCNC: 138 MMOL/L (ref 136–145)
TRIGL SERPL-MCNC: 173 MG/DL (ref 0–149)
URATE SERPL-MCNC: 6 MG/DL (ref 4–7.5)
VLDL: 35 MG/DL (ref 0–40)

## 2023-12-21 PROCEDURE — 36415 COLL VENOUS BLD VENIPUNCTURE: CPT

## 2023-12-21 PROCEDURE — 84550 ASSAY OF BLOOD/URIC ACID: CPT

## 2023-12-21 PROCEDURE — 80053 COMPREHEN METABOLIC PANEL: CPT

## 2023-12-21 PROCEDURE — 80061 LIPID PANEL: CPT

## 2024-01-09 DIAGNOSIS — E78.5 DYSLIPIDEMIA: Primary | ICD-10-CM

## 2024-03-18 ENCOUNTER — TELEPHONE (OUTPATIENT)
Dept: PRIMARY CARE | Facility: CLINIC | Age: 69
End: 2024-03-18
Payer: MEDICARE

## 2024-03-18 DIAGNOSIS — E78.5 DYSLIPIDEMIA: Primary | ICD-10-CM

## 2024-03-18 DIAGNOSIS — K22.70 BARRETT'S ESOPHAGUS WITHOUT DYSPLASIA: ICD-10-CM

## 2024-03-18 RX ORDER — OMEPRAZOLE 40 MG/1
40 CAPSULE, DELAYED RELEASE ORAL
Qty: 90 CAPSULE | Refills: 3 | Status: SHIPPED | OUTPATIENT
Start: 2024-03-18

## 2024-03-18 RX ORDER — EZETIMIBE 10 MG/1
10 TABLET ORAL DAILY
Qty: 90 TABLET | Refills: 3 | Status: SHIPPED | OUTPATIENT
Start: 2024-03-18 | End: 2025-03-13

## 2024-03-18 NOTE — TELEPHONE ENCOUNTER
Med Refill   ezetimibe (Zetia) 10 mg tablet [[id number]]   omeprazole (PriLOSEC) 40 mg DR capsule [[id number]]     [facility] - [address] [address]  Phone: [phone]  Fax: [phone]

## 2024-05-10 ENCOUNTER — TELEPHONE (OUTPATIENT)
Dept: PRIMARY CARE | Facility: CLINIC | Age: 69
End: 2024-05-10
Payer: MEDICARE

## 2024-05-10 DIAGNOSIS — I10 PRIMARY HYPERTENSION: Primary | ICD-10-CM

## 2024-05-13 ENCOUNTER — LAB (OUTPATIENT)
Dept: LAB | Facility: LAB | Age: 69
End: 2024-05-13
Payer: MEDICARE

## 2024-05-13 DIAGNOSIS — I10 PRIMARY HYPERTENSION: ICD-10-CM

## 2024-05-13 LAB
ANION GAP SERPL CALC-SCNC: 9 MMOL/L (ref 10–20)
BUN SERPL-MCNC: 24 MG/DL (ref 6–23)
CALCIUM SERPL-MCNC: 9.1 MG/DL (ref 8.6–10.3)
CHLORIDE SERPL-SCNC: 107 MMOL/L (ref 98–107)
CO2 SERPL-SCNC: 29 MMOL/L (ref 21–32)
CREAT SERPL-MCNC: 0.89 MG/DL (ref 0.5–1.3)
EGFRCR SERPLBLD CKD-EPI 2021: >90 ML/MIN/1.73M*2
ERYTHROCYTE [DISTWIDTH] IN BLOOD BY AUTOMATED COUNT: 13.7 % (ref 11.5–14.5)
GLUCOSE SERPL-MCNC: 108 MG/DL (ref 74–99)
HCT VFR BLD AUTO: 45.1 % (ref 41–52)
HGB BLD-MCNC: 14.8 G/DL (ref 13.5–17.5)
MCH RBC QN AUTO: 30.4 PG (ref 26–34)
MCHC RBC AUTO-ENTMCNC: 32.8 G/DL (ref 32–36)
MCV RBC AUTO: 93 FL (ref 80–100)
NRBC BLD-RTO: 0 /100 WBCS (ref 0–0)
PLATELET # BLD AUTO: 140 X10*3/UL (ref 150–450)
POTASSIUM SERPL-SCNC: 4.4 MMOL/L (ref 3.5–5.3)
RBC # BLD AUTO: 4.87 X10*6/UL (ref 4.5–5.9)
SODIUM SERPL-SCNC: 141 MMOL/L (ref 136–145)
WBC # BLD AUTO: 6.1 X10*3/UL (ref 4.4–11.3)

## 2024-05-13 PROCEDURE — 36415 COLL VENOUS BLD VENIPUNCTURE: CPT

## 2024-05-13 PROCEDURE — 80048 BASIC METABOLIC PNL TOTAL CA: CPT

## 2024-05-13 PROCEDURE — 85027 COMPLETE CBC AUTOMATED: CPT

## 2024-05-14 ENCOUNTER — TELEPHONE (OUTPATIENT)
Dept: PRIMARY CARE | Facility: CLINIC | Age: 69
End: 2024-05-14
Payer: MEDICARE

## 2024-05-14 DIAGNOSIS — E78.5 DYSLIPIDEMIA: Primary | ICD-10-CM

## 2024-05-14 NOTE — TELEPHONE ENCOUNTER
Rachid Marcial, APRN-CNP  Do [id number] [facility] Clinical Support Staff38 minutes ago (9:14 AM)     Order for lipid panel put in

## 2024-05-15 ENCOUNTER — LAB (OUTPATIENT)
Dept: LAB | Facility: LAB | Age: 69
End: 2024-05-15
Payer: MEDICARE

## 2024-05-15 DIAGNOSIS — E78.5 DYSLIPIDEMIA: ICD-10-CM

## 2024-05-15 LAB
CHOLEST SERPL-MCNC: 149 MG/DL (ref 0–199)
CHOLESTEROL/HDL RATIO: 3
HDLC SERPL-MCNC: 48.9 MG/DL
LDLC SERPL CALC-MCNC: 74 MG/DL
NON HDL CHOLESTEROL: 100 MG/DL (ref 0–149)
TRIGL SERPL-MCNC: 129 MG/DL (ref 0–149)
VLDL: 26 MG/DL (ref 0–40)

## 2024-05-15 PROCEDURE — 80061 LIPID PANEL: CPT

## 2024-05-15 PROCEDURE — 36415 COLL VENOUS BLD VENIPUNCTURE: CPT

## 2024-05-17 ENCOUNTER — OFFICE VISIT (OUTPATIENT)
Dept: PRIMARY CARE | Facility: CLINIC | Age: 69
End: 2024-05-17
Payer: MEDICARE

## 2024-05-17 VITALS
HEIGHT: 68 IN | DIASTOLIC BLOOD PRESSURE: 74 MMHG | WEIGHT: 146 LBS | OXYGEN SATURATION: 99 % | BODY MASS INDEX: 22.13 KG/M2 | HEART RATE: 66 BPM | RESPIRATION RATE: 16 BRPM | SYSTOLIC BLOOD PRESSURE: 122 MMHG

## 2024-05-17 DIAGNOSIS — I10 PRIMARY HYPERTENSION: ICD-10-CM

## 2024-05-17 DIAGNOSIS — Z00.00 MEDICARE ANNUAL WELLNESS VISIT, SUBSEQUENT: ICD-10-CM

## 2024-05-17 DIAGNOSIS — G62.9 NEUROPATHY: ICD-10-CM

## 2024-05-17 DIAGNOSIS — Z12.5 SCREENING FOR PROSTATE CANCER: ICD-10-CM

## 2024-05-17 DIAGNOSIS — I20.0 UNSTABLE ANGINA PECTORIS (MULTI): ICD-10-CM

## 2024-05-17 DIAGNOSIS — K22.70 BARRETT'S ESOPHAGUS WITHOUT DYSPLASIA: ICD-10-CM

## 2024-05-17 DIAGNOSIS — E78.5 DYSLIPIDEMIA: Primary | ICD-10-CM

## 2024-05-17 DIAGNOSIS — E55.9 VITAMIN D DEFICIENCY: ICD-10-CM

## 2024-05-17 DIAGNOSIS — R73.03 PREDIABETES: ICD-10-CM

## 2024-05-17 DIAGNOSIS — R73.9 HYPERGLYCEMIA: ICD-10-CM

## 2024-05-17 PROBLEM — E53.8 COBALAMIN DEFICIENCY: Status: ACTIVE | Noted: 2023-06-02

## 2024-05-17 PROBLEM — Z86.39 HISTORY OF ELEVATED LIPIDS: Status: RESOLVED | Noted: 2024-05-17 | Resolved: 2024-05-17

## 2024-05-17 PROBLEM — R91.1 LUNG NODULE: Status: ACTIVE | Noted: 2022-09-05

## 2024-05-17 PROBLEM — H61.20 IMPACTED CERUMEN: Status: ACTIVE | Noted: 2024-05-17

## 2024-05-17 PROBLEM — R73.01 IMPAIRED FASTING GLUCOSE: Status: ACTIVE | Noted: 2023-05-17

## 2024-05-17 PROBLEM — U07.1 DISEASE DUE TO SEVERE ACUTE RESPIRATORY SYNDROME CORONAVIRUS 2 (SARS-COV-2): Status: ACTIVE | Noted: 2022-09-05

## 2024-05-17 PROBLEM — I72.1: Status: ACTIVE | Noted: 2022-11-30

## 2024-05-17 PROBLEM — N20.1 CALCULUS OF URETER: Status: ACTIVE | Noted: 2023-05-17

## 2024-05-17 PROBLEM — E53.8 VITAMIN B 12 DEFICIENCY: Status: RESOLVED | Noted: 2023-06-02 | Resolved: 2024-05-17

## 2024-05-17 PROBLEM — R73.01 ABNORMAL FASTING GLUCOSE: Status: RESOLVED | Noted: 2023-05-17 | Resolved: 2024-05-17

## 2024-05-17 PROBLEM — R22.31 LOCALIZED SWELLING ON RIGHT HAND: Status: ACTIVE | Noted: 2024-05-17

## 2024-05-17 PROBLEM — Z86.39 HISTORY OF ELEVATED LIPIDS: Status: ACTIVE | Noted: 2024-05-17

## 2024-05-17 PROBLEM — M25.539 PAIN IN WRIST: Status: ACTIVE | Noted: 2024-05-17

## 2024-05-17 PROBLEM — M54.9 BACK PAIN: Status: ACTIVE | Noted: 2023-05-17

## 2024-05-17 PROBLEM — R05.9 COUGH, UNSPECIFIED: Status: ACTIVE | Noted: 2022-09-05

## 2024-05-17 PROBLEM — E53.8 COBALAMIN DEFICIENCY: Status: RESOLVED | Noted: 2023-06-02 | Resolved: 2024-05-17

## 2024-05-17 PROBLEM — R93.89 ABNORMAL X-RAY EXAMINATION: Status: ACTIVE | Noted: 2023-05-17

## 2024-05-17 PROBLEM — Z86.16 PERSONAL HISTORY OF COVID-19: Status: ACTIVE | Noted: 2022-10-07

## 2024-05-17 PROCEDURE — 3078F DIAST BP <80 MM HG: CPT | Performed by: NURSE PRACTITIONER

## 2024-05-17 PROCEDURE — 1160F RVW MEDS BY RX/DR IN RCRD: CPT | Performed by: NURSE PRACTITIONER

## 2024-05-17 PROCEDURE — 3074F SYST BP LT 130 MM HG: CPT | Performed by: NURSE PRACTITIONER

## 2024-05-17 PROCEDURE — 1159F MED LIST DOCD IN RCRD: CPT | Performed by: NURSE PRACTITIONER

## 2024-05-17 PROCEDURE — 1036F TOBACCO NON-USER: CPT | Performed by: NURSE PRACTITIONER

## 2024-05-17 PROCEDURE — 99214 OFFICE O/P EST MOD 30 MIN: CPT | Performed by: NURSE PRACTITIONER

## 2024-05-17 ASSESSMENT — ANXIETY QUESTIONNAIRES
6. BECOMING EASILY ANNOYED OR IRRITABLE: NOT AT ALL
5. BEING SO RESTLESS THAT IT IS HARD TO SIT STILL: NOT AT ALL
2. NOT BEING ABLE TO STOP OR CONTROL WORRYING: NOT AT ALL
IF YOU CHECKED OFF ANY PROBLEMS ON THIS QUESTIONNAIRE, HOW DIFFICULT HAVE THESE PROBLEMS MADE IT FOR YOU TO DO YOUR WORK, TAKE CARE OF THINGS AT HOME, OR GET ALONG WITH OTHER PEOPLE: NOT DIFFICULT AT ALL
1. FEELING NERVOUS, ANXIOUS, OR ON EDGE: NOT AT ALL
4. TROUBLE RELAXING: NOT AT ALL
GAD7 TOTAL SCORE: 0
7. FEELING AFRAID AS IF SOMETHING AWFUL MIGHT HAPPEN: NOT AT ALL
3. WORRYING TOO MUCH ABOUT DIFFERENT THINGS: NOT AT ALL

## 2024-05-17 ASSESSMENT — ENCOUNTER SYMPTOMS
DEPRESSION: 0
OCCASIONAL FEELINGS OF UNSTEADINESS: 0
LOSS OF SENSATION IN FEET: 0

## 2024-05-17 ASSESSMENT — PATIENT HEALTH QUESTIONNAIRE - PHQ9
1. LITTLE INTEREST OR PLEASURE IN DOING THINGS: NOT AT ALL
SUM OF ALL RESPONSES TO PHQ9 QUESTIONS 1 AND 2: 0
2. FEELING DOWN, DEPRESSED OR HOPELESS: NOT AT ALL

## 2024-05-17 ASSESSMENT — COLUMBIA-SUICIDE SEVERITY RATING SCALE - C-SSRS
6. HAVE YOU EVER DONE ANYTHING, STARTED TO DO ANYTHING, OR PREPARED TO DO ANYTHING TO END YOUR LIFE?: NO
1. IN THE PAST MONTH, HAVE YOU WISHED YOU WERE DEAD OR WISHED YOU COULD GO TO SLEEP AND NOT WAKE UP?: NO
2. HAVE YOU ACTUALLY HAD ANY THOUGHTS OF KILLING YOURSELF?: NO

## 2024-05-17 NOTE — PROGRESS NOTES
"Subjective   Patient ID: Eugene Patrick is a 69 y.o. male who presents for Follow-up.    Patient is following up for lab results review and management of multiple chronic diseases.  His lab results are unremarkable beside fasting blood glucose slightly elevated at 108.  Advises he takes his medications as prescribed and his symptoms are controlled no side effect noted.  Reports he is doing great and denies acute medical complaint.         Review of Systems   All other systems reviewed and are negative.      Objective   /74   Pulse 66   Resp 16   Ht 1.727 m (5' 8\")   Wt 66.2 kg (146 lb)   SpO2 99%   BMI 22.20 kg/m²     Physical Exam  Constitutional:       Appearance: Normal appearance. He is normal weight.   HENT:      Head: Normocephalic and atraumatic.      Right Ear: Tympanic membrane, ear canal and external ear normal.      Left Ear: Tympanic membrane, ear canal and external ear normal.      Ears:      Comments: Left cerumen without impaction.     Nose: Nose normal.      Mouth/Throat:      Mouth: Mucous membranes are moist.   Eyes:      Extraocular Movements: Extraocular movements intact.      Conjunctiva/sclera: Conjunctivae normal.      Pupils: Pupils are equal, round, and reactive to light.   Cardiovascular:      Rate and Rhythm: Normal rate and regular rhythm.      Pulses: Normal pulses.      Heart sounds: Normal heart sounds.   Pulmonary:      Effort: Pulmonary effort is normal.      Breath sounds: Normal breath sounds.   Abdominal:      General: Abdomen is flat. Bowel sounds are normal.      Palpations: Abdomen is soft.   Musculoskeletal:      Cervical back: Neck supple.   Skin:     General: Skin is warm and dry.   Neurological:      General: No focal deficit present.      Mental Status: He is alert and oriented to person, place, and time.   Psychiatric:         Mood and Affect: Mood normal.         Behavior: Behavior normal.         Thought Content: Thought content normal.         Judgment: " Judgment normal.         Assessment/Plan   Problem List Items Addressed This Visit       Cordova's esophagus without dysplasia    Dyslipidemia    Hypertension

## 2024-05-17 NOTE — PATIENT INSTRUCTIONS
Your lab results are normal, besides fasting blood glucose slightly elevated at 108.  I recommend low-carb diet and increase activities for management of blood sugar.  Continue taking all current medications as prescribed and complete labs a few days prior to follow-up in December for annual Medicare wellness exam.

## 2024-06-11 ENCOUNTER — APPOINTMENT (OUTPATIENT)
Dept: PRIMARY CARE | Facility: CLINIC | Age: 69
End: 2024-06-11
Payer: MEDICARE

## 2024-07-23 ENCOUNTER — TELEPHONE (OUTPATIENT)
Dept: PRIMARY CARE | Facility: CLINIC | Age: 69
End: 2024-07-23
Payer: MEDICARE

## 2024-07-23 DIAGNOSIS — I10 PRIMARY HYPERTENSION: ICD-10-CM

## 2024-07-23 RX ORDER — LISINOPRIL 5 MG/1
5 TABLET ORAL DAILY
Qty: 90 TABLET | Refills: 3 | OUTPATIENT
Start: 2024-07-23

## 2024-07-23 NOTE — TELEPHONE ENCOUNTER
Rx Refill Request Telephone Encounter    Name:  Eugene Patrick  :  [id number]  Medication Name: Lisinopril  5 mg        Take 1 tablet by mouth once daily  Specific Pharmacy location:  [facility]  Date of last appointment:  2024

## 2024-07-23 NOTE — TELEPHONE ENCOUNTER
Med Refill   lisinopril 5 mg tablet [[id number]]     [facility] [id number] [facility] [address], [facility] [address]  Phone: [phone]  Fax: [phone]  JELENA #: --     LOV: 5/17/24    NOV: 12/5/24

## 2024-07-29 NOTE — TELEPHONE ENCOUNTER
Per call from [facility] about this refill on the Lisinopril I let them know I will call Mr. Patrick he state that is seeing a new provider so will jessica give him refills til he sees the rachel Duran And the last refill was send over from Jessica in Dec of 2023 please advise

## 2024-08-27 DIAGNOSIS — M54.40 LOW BACK PAIN WITH SCIATICA, SCIATICA LATERALITY UNSPECIFIED, UNSPECIFIED BACK PAIN LATERALITY, UNSPECIFIED CHRONICITY: ICD-10-CM

## 2024-08-27 DIAGNOSIS — I10 PRIMARY HYPERTENSION: ICD-10-CM

## 2024-08-27 RX ORDER — LISINOPRIL 5 MG/1
5 TABLET ORAL DAILY
Qty: 90 TABLET | Refills: 0 | Status: SHIPPED | OUTPATIENT
Start: 2024-08-27

## 2024-08-27 RX ORDER — GABAPENTIN 300 MG/1
300 CAPSULE ORAL NIGHTLY
Qty: 90 CAPSULE | Refills: 0 | Status: SHIPPED | OUTPATIENT
Start: 2024-08-27 | End: 2024-11-25

## 2024-09-26 ENCOUNTER — TELEPHONE (OUTPATIENT)
Dept: CARDIOLOGY | Facility: HOSPITAL | Age: 69
End: 2024-09-26
Payer: MEDICARE

## 2024-09-26 DIAGNOSIS — I10 PRIMARY HYPERTENSION: ICD-10-CM

## 2024-09-26 DIAGNOSIS — R73.03 PREDIABETES: ICD-10-CM

## 2024-09-26 DIAGNOSIS — E78.5 DYSLIPIDEMIA: Primary | ICD-10-CM

## 2024-09-26 NOTE — TELEPHONE ENCOUNTER
Labs were placed and pt notified.        ----- Message from Nurse Alexandra LANIER sent at 9/26/2024  1:30 PM EDT -----  Regarding: Labs  The patient is requesting orders for labs that he needs to do prior to his upcoming appointment with Dr. Handy.

## 2024-11-08 ENCOUNTER — LAB (OUTPATIENT)
Dept: LAB | Facility: LAB | Age: 69
End: 2024-11-08
Payer: MEDICARE

## 2024-11-08 DIAGNOSIS — E78.5 DYSLIPIDEMIA: ICD-10-CM

## 2024-11-08 DIAGNOSIS — I10 PRIMARY HYPERTENSION: ICD-10-CM

## 2024-11-08 LAB
ALBUMIN SERPL BCP-MCNC: 4.3 G/DL (ref 3.4–5)
ALP SERPL-CCNC: 49 U/L (ref 33–136)
ALT SERPL W P-5'-P-CCNC: 14 U/L (ref 10–52)
ANION GAP SERPL CALC-SCNC: 12 MMOL/L (ref 10–20)
AST SERPL W P-5'-P-CCNC: 21 U/L (ref 9–39)
BILIRUB SERPL-MCNC: 0.6 MG/DL (ref 0–1.2)
BUN SERPL-MCNC: 21 MG/DL (ref 6–23)
CALCIUM SERPL-MCNC: 9.2 MG/DL (ref 8.6–10.3)
CHLORIDE SERPL-SCNC: 103 MMOL/L (ref 98–107)
CHOLEST SERPL-MCNC: 146 MG/DL (ref 0–199)
CHOLESTEROL/HDL RATIO: 3.1
CO2 SERPL-SCNC: 29 MMOL/L (ref 21–32)
CREAT SERPL-MCNC: 0.86 MG/DL (ref 0.5–1.3)
EGFRCR SERPLBLD CKD-EPI 2021: >90 ML/MIN/1.73M*2
ERYTHROCYTE [DISTWIDTH] IN BLOOD BY AUTOMATED COUNT: 13.9 % (ref 11.5–14.5)
GLUCOSE SERPL-MCNC: 91 MG/DL (ref 74–99)
HCT VFR BLD AUTO: 47.1 % (ref 41–52)
HDLC SERPL-MCNC: 47.5 MG/DL
HGB BLD-MCNC: 15 G/DL (ref 13.5–17.5)
LDLC SERPL CALC-MCNC: 79 MG/DL
MCH RBC QN AUTO: 29.4 PG (ref 26–34)
MCHC RBC AUTO-ENTMCNC: 31.8 G/DL (ref 32–36)
MCV RBC AUTO: 92 FL (ref 80–100)
NON HDL CHOLESTEROL: 99 MG/DL (ref 0–149)
NRBC BLD-RTO: 0 /100 WBCS (ref 0–0)
PLATELET # BLD AUTO: 152 X10*3/UL (ref 150–450)
POTASSIUM SERPL-SCNC: 4.7 MMOL/L (ref 3.5–5.3)
PROT SERPL-MCNC: 6.6 G/DL (ref 6.4–8.2)
RBC # BLD AUTO: 5.1 X10*6/UL (ref 4.5–5.9)
SODIUM SERPL-SCNC: 139 MMOL/L (ref 136–145)
TRIGL SERPL-MCNC: 96 MG/DL (ref 0–149)
VLDL: 19 MG/DL (ref 0–40)
WBC # BLD AUTO: 5 X10*3/UL (ref 4.4–11.3)

## 2024-11-08 PROCEDURE — 80053 COMPREHEN METABOLIC PANEL: CPT

## 2024-11-08 PROCEDURE — 85027 COMPLETE CBC AUTOMATED: CPT

## 2024-11-08 PROCEDURE — 36415 COLL VENOUS BLD VENIPUNCTURE: CPT

## 2024-11-08 PROCEDURE — 80061 LIPID PANEL: CPT

## 2024-11-10 PROBLEM — B02.9 HERPES ZOSTER: Status: ACTIVE | Noted: 2023-05-17

## 2024-11-10 PROBLEM — S67.21XA CRUSHING INJURY OF RIGHT HAND: Status: ACTIVE | Noted: 2023-05-17

## 2024-11-10 PROBLEM — S60.229A CONTUSION OF HAND: Status: ACTIVE | Noted: 2023-05-17

## 2024-11-12 ENCOUNTER — APPOINTMENT (OUTPATIENT)
Dept: CARDIOLOGY | Facility: CLINIC | Age: 69
End: 2024-11-12
Payer: MEDICARE

## 2024-11-12 VITALS
HEART RATE: 59 BPM | HEIGHT: 68 IN | WEIGHT: 138 LBS | BODY MASS INDEX: 20.92 KG/M2 | SYSTOLIC BLOOD PRESSURE: 130 MMHG | DIASTOLIC BLOOD PRESSURE: 76 MMHG

## 2024-11-12 DIAGNOSIS — E78.5 DYSLIPIDEMIA: ICD-10-CM

## 2024-11-12 PROCEDURE — 1159F MED LIST DOCD IN RCRD: CPT | Performed by: INTERNAL MEDICINE

## 2024-11-12 PROCEDURE — 93010 ELECTROCARDIOGRAM REPORT: CPT | Performed by: INTERNAL MEDICINE

## 2024-11-12 PROCEDURE — 1160F RVW MEDS BY RX/DR IN RCRD: CPT | Performed by: INTERNAL MEDICINE

## 2024-11-12 PROCEDURE — 3008F BODY MASS INDEX DOCD: CPT | Performed by: INTERNAL MEDICINE

## 2024-11-12 PROCEDURE — 99213 OFFICE O/P EST LOW 20 MIN: CPT | Performed by: INTERNAL MEDICINE

## 2024-11-12 PROCEDURE — 3078F DIAST BP <80 MM HG: CPT | Performed by: INTERNAL MEDICINE

## 2024-11-12 PROCEDURE — 3075F SYST BP GE 130 - 139MM HG: CPT | Performed by: INTERNAL MEDICINE

## 2024-11-12 PROCEDURE — 93005 ELECTROCARDIOGRAM TRACING: CPT | Performed by: INTERNAL MEDICINE

## 2024-11-12 PROCEDURE — 1036F TOBACCO NON-USER: CPT | Performed by: INTERNAL MEDICINE

## 2024-11-12 ASSESSMENT — ENCOUNTER SYMPTOMS
OCCASIONAL FEELINGS OF UNSTEADINESS: 0
LOSS OF SENSATION IN FEET: 0
DEPRESSION: 0

## 2024-11-12 NOTE — LETTER
"November 12, 2024     Barbara Gilman DO  [address]  [facility] [address]  [facility] [address]    Patient: Eugene Patrick   YOB: 1955   Date of Visit: 11/12/2024       Dear Dr. Barbara Gilman DO:    Thank you for referring Eugene Patrick to me for evaluation. Below are my notes for this consultation.  If you have questions, please do not hesitate to call me. I look forward to following your patient along with you.       Sincerely,     Kwame Handy MD      CC: No Recipients  ______________________________________________________________________________________    Counseling:  The patient was counseled regarding diagnostic results, instructions for management, risk factor reductions, prognosis, patient and family education, impressions, risks and benefits of treatment options and importance of compliance with treatment.      Chief Complaint:   The patient presents today for annual followup of CAD.    History Of Present Illness:    Eugene Patrick is a 68 year old male patient who presents today for annual followup of CAD. His PMH is significant for Cordova's esophagus, bilateral nephrolithiasis, h/o COVID infection, HTN, h/o rheumatic fever at 7 years of age, LDL hyperlipoproteinemia, neuropathy, and h/o shingles. Over the past year, the patient states that he has done well from a cardiac standpoint. He denies any CP, chest discomfort or SOB. BP has been stable. EKG today shows NSR with no acute changes. Per the patient, he has been taking the Nexletol 3x/week rather than on a daily basis, and he discontinued his lisinopril as he has been managing his BP with diet and exercise.     Last Recorded Vitals:  Vitals:    11/12/24 1300   BP: 130/76   BP Location: Left arm   Pulse: 59   Weight: 62.6 kg (138 lb)   Height: 1.727 m (5' 8\")       Past Surgical History:  He has a past surgical history that includes Other surgical history (01/20/2020); Other surgical history (01/20/2020); " Other surgical history (01/20/2020); Tonsillectomy (02/23/2016); and Hernia repair (02/23/2016).      Social History:  He reports that he has never smoked. He has never used smokeless tobacco. He reports that he does not use drugs. No history on file for alcohol use.    Family History:  No family history on file.     Allergies:  Statins-hmg-coa reductase inhibitors    Outpatient Medications:  Current Outpatient Medications   Medication Instructions   • bempedoic acid 180 mg, oral, Daily   • ezetimibe (ZETIA) 10 mg, oral, Daily   • gabapentin (NEURONTIN) 300 mg, oral, Nightly   • lisinopril 5 mg, oral, Daily   • nitroglycerin (NITROSTAT) 0.4 mg   • omeprazole (PRILOSEC) 40 mg, oral, Daily before breakfast     Review of Systems   All other systems reviewed and are negative.     Physical Exam:  Constitutional:       Appearance: Healthy appearance. Not in distress.   Neck:      Vascular: No JVR. JVD normal.   Pulmonary:      Effort: Pulmonary effort is normal.      Breath sounds: Normal breath sounds. No wheezing. No rhonchi. No rales.   Chest:      Chest wall: Not tender to palpatation.   Cardiovascular:      PMI at left midclavicular line. Normal rate. Regular rhythm. Normal S1. Normal S2.       Murmurs: There is no murmur.      No gallop.  No click. No rub.   Pulses:     Intact distal pulses.   Edema:     Peripheral edema absent.   Abdominal:      General: Bowel sounds are normal.      Palpations: Abdomen is soft.      Tenderness: There is no abdominal tenderness.   Musculoskeletal: Normal range of motion.         General: No tenderness. Skin:     General: Skin is warm and dry.   Neurological:      General: No focal deficit present.      Mental Status: Alert and oriented to person, place and time.          Last Labs:  CBC -  Lab Results   Component Value Date    WBC 5.0 11/08/2024    HGB 15.0 11/08/2024    HCT 47.1 11/08/2024    MCV 92 11/08/2024     11/08/2024       CMP -  Lab Results   Component Value Date     CALCIUM 9.2 11/08/2024    PHOS 1.9 (L) 11/07/2019    PROT 6.6 11/08/2024    ALBUMIN 4.3 11/08/2024    AST 21 11/08/2024    ALT 14 11/08/2024    ALKPHOS 49 11/08/2024    BILITOT 0.6 11/08/2024       LIPID PANEL -   Lab Results   Component Value Date    CHOL 146 11/08/2024    TRIG 96 11/08/2024    HDL 47.5 11/08/2024    CHHDL 3.1 11/08/2024    LDLF 120 (H) 05/22/2023    VLDL 19 11/08/2024    NHDL 99 11/08/2024       RENAL FUNCTION PANEL -   Lab Results   Component Value Date    GLUCOSE 91 11/08/2024     11/08/2024    K 4.7 11/08/2024     11/08/2024    CO2 29 11/08/2024    ANIONGAP 12 11/08/2024    BUN 21 11/08/2024    CREATININE 0.86 11/08/2024    GFRMALE >90 05/22/2023    CALCIUM 9.2 11/08/2024    PHOS 1.9 (L) 11/07/2019    ALBUMIN 4.3 11/08/2024        Lab Results   Component Value Date    HGBA1C 6.2 (H) 11/21/2023       Last Cardiology Tests:  11/30/2022 - Arterial Duplex U/S  Right Upper Arterial: The right radial artery is patent with triphasic waveforms. No evidence of disease or pseudoaneurysm noted in the right upper extremity. Followed the radial artery with ultrasound as far as it could until the bony prominences gave way to shadowing. Patients area of concern was a small lump near base of hand near the thumb.     10/07/2022 - Cardiac Catheterization (LH)  1. Mild CAD in right-dominant circulation.  2. Normal left-sided filling pressure (LVEDP 7 mmHg).  3. No gradient on pull-back to suggest aortic stenosis.     11/06/2020 - CT Cardiac Scoring  1. Total: 160 as compared with 20 previously.  2. The visualized mid/lower ascending thoracic aorta measures 2.9 cm in diameter. The heart is normal in size. No pericardial effusion is present.  3. No gross evidence of mediastinal or hilar lymphadenopathy or masses is identified. The visualized segments of the lungs are hyperinflated. Punctate indeterminate nodule in the right middle lobe measuring 4 mm.  4. The visualized subdiaphragmatic structures  appear intact.     07/24/2020 - Exercise Stress Test  1. No clinical or electrocardiographic evidence for ischemia at maximal workload.  2. The adequate level of stress was achieved.    Lab review: I have personally reviewed the laboratory result(s).      Assessment/Plan  1) Mild CAD by Cath  On Zetia 10 mg daily, Nexletol 180 mg 3x/week  D/W patient aggressive secondary risk factor modification to achieve LDL goal 60-70  Lipid panel 11/08/2024 with total cholesterol, LDL and triglycerides of 146, 79 and 96 respectively  Denies CP, chest discomfort or SOB  BP stable  EKG stable  Patient takes Nexletol 3x/week rather than daily  Patient discontinued lisinopril and has been managing BP conservatively with diet and exercise  Continue current medical Rx - New Rx for Nexletol 3x/week dosing sent to pharmacy  F/U 1 year          Scribe Attestation  By signing my name below, IDanica Scribe   attest that this documentation has been prepared under the direction and in the presence of Kwame Handy MD.

## 2024-11-12 NOTE — PATIENT INSTRUCTIONS
Continue all current medications as prescribed. You can remain on the Nexletol at 3 times per week, and a new prescription has been sent to your pharmacy.  Followup with Dr. Handy in 1 year, sooner should any issues or concerns arise before then.     If you have any questions or cardiac concerns, please call our office at [phone].

## 2024-11-12 NOTE — PROGRESS NOTES
"Counseling:  The patient was counseled regarding diagnostic results, instructions for management, risk factor reductions, prognosis, patient and family education, impressions, risks and benefits of treatment options and importance of compliance with treatment.      Chief Complaint:   The patient presents today for annual followup of CAD.    History Of Present Illness:    Eugene Patrick is a 68 year old male patient who presents today for annual followup of CAD. His PMH is significant for Cordova's esophagus, bilateral nephrolithiasis, h/o COVID infection, HTN, h/o rheumatic fever at 7 years of age, LDL hyperlipoproteinemia, neuropathy, and h/o shingles. Over the past year, the patient states that he has done well from a cardiac standpoint. He denies any CP, chest discomfort or SOB. BP has been stable. EKG today shows NSR with no acute changes. Per the patient, he has been taking the Nexletol 3x/week rather than on a daily basis, and he discontinued his lisinopril as he has been managing his BP with diet and exercise.     Last Recorded Vitals:  Vitals:    11/12/24 1300   BP: 130/76   BP Location: Left arm   Pulse: 59   Weight: 62.6 kg (138 lb)   Height: 1.727 m (5' 8\")       Past Surgical History:  He has a past surgical history that includes Other surgical history (01/20/2020); Other surgical history (01/20/2020); Other surgical history (01/20/2020); Tonsillectomy (02/23/2016); and Hernia repair (02/23/2016).      Social History:  He reports that he has never smoked. He has never used smokeless tobacco. He reports that he does not use drugs. No history on file for alcohol use.    Family History:  No family history on file.     Allergies:  Statins-hmg-coa reductase inhibitors    Outpatient Medications:  Current Outpatient Medications   Medication Instructions    bempedoic acid 180 mg, oral, Daily    ezetimibe (ZETIA) 10 mg, oral, Daily    gabapentin (NEURONTIN) 300 mg, oral, Nightly    lisinopril 5 mg, oral, Daily    " nitroglycerin (NITROSTAT) 0.4 mg    omeprazole (PRILOSEC) 40 mg, oral, Daily before breakfast     Review of Systems   All other systems reviewed and are negative.     Physical Exam:  Constitutional:       Appearance: Healthy appearance. Not in distress.   Neck:      Vascular: No JVR. JVD normal.   Pulmonary:      Effort: Pulmonary effort is normal.      Breath sounds: Normal breath sounds. No wheezing. No rhonchi. No rales.   Chest:      Chest wall: Not tender to palpatation.   Cardiovascular:      PMI at left midclavicular line. Normal rate. Regular rhythm. Normal S1. Normal S2.       Murmurs: There is no murmur.      No gallop.  No click. No rub.   Pulses:     Intact distal pulses.   Edema:     Peripheral edema absent.   Abdominal:      General: Bowel sounds are normal.      Palpations: Abdomen is soft.      Tenderness: There is no abdominal tenderness.   Musculoskeletal: Normal range of motion.         General: No tenderness. Skin:     General: Skin is warm and dry.   Neurological:      General: No focal deficit present.      Mental Status: Alert and oriented to person, place and time.          Last Labs:  CBC -  Lab Results   Component Value Date    WBC 5.0 11/08/2024    HGB 15.0 11/08/2024    HCT 47.1 11/08/2024    MCV 92 11/08/2024     11/08/2024       CMP -  Lab Results   Component Value Date    CALCIUM 9.2 11/08/2024    PHOS 1.9 (L) 11/07/2019    PROT 6.6 11/08/2024    ALBUMIN 4.3 11/08/2024    AST 21 11/08/2024    ALT 14 11/08/2024    ALKPHOS 49 11/08/2024    BILITOT 0.6 11/08/2024       LIPID PANEL -   Lab Results   Component Value Date    CHOL 146 11/08/2024    TRIG 96 11/08/2024    HDL 47.5 11/08/2024    CHHDL 3.1 11/08/2024    LDLF 120 (H) 05/22/2023    VLDL 19 11/08/2024    NHDL 99 11/08/2024       RENAL FUNCTION PANEL -   Lab Results   Component Value Date    GLUCOSE 91 11/08/2024     11/08/2024    K 4.7 11/08/2024     11/08/2024    CO2 29 11/08/2024    ANIONGAP 12 11/08/2024    BUN  21 11/08/2024    CREATININE 0.86 11/08/2024    GFRMALE >90 05/22/2023    CALCIUM 9.2 11/08/2024    PHOS 1.9 (L) 11/07/2019    ALBUMIN 4.3 11/08/2024        Lab Results   Component Value Date    HGBA1C 6.2 (H) 11/21/2023       Last Cardiology Tests:  11/30/2022 - Arterial Duplex U/S  Right Upper Arterial: The right radial artery is patent with triphasic waveforms. No evidence of disease or pseudoaneurysm noted in the right upper extremity. Followed the radial artery with ultrasound as far as it could until the bony prominences gave way to shadowing. Patients area of concern was a small lump near base of hand near the thumb.     10/07/2022 - Cardiac Catheterization (LH)  1. Mild CAD in right-dominant circulation.  2. Normal left-sided filling pressure (LVEDP 7 mmHg).  3. No gradient on pull-back to suggest aortic stenosis.     11/06/2020 - CT Cardiac Scoring  1. Total: 160 as compared with 20 previously.  2. The visualized mid/lower ascending thoracic aorta measures 2.9 cm in diameter. The heart is normal in size. No pericardial effusion is present.  3. No gross evidence of mediastinal or hilar lymphadenopathy or masses is identified. The visualized segments of the lungs are hyperinflated. Punctate indeterminate nodule in the right middle lobe measuring 4 mm.  4. The visualized subdiaphragmatic structures appear intact.     07/24/2020 - Exercise Stress Test  1. No clinical or electrocardiographic evidence for ischemia at maximal workload.  2. The adequate level of stress was achieved.    Lab review: I have personally reviewed the laboratory result(s).      Assessment/Plan   1) Mild CAD by Cath  On Zetia 10 mg daily, Nexletol 180 mg 3x/week  D/W patient aggressive secondary risk factor modification to achieve LDL goal 60-70  Lipid panel 11/08/2024 with total cholesterol, LDL and triglycerides of 146, 79 and 96 respectively  Denies CP, chest discomfort or SOB  BP stable  EKG stable  Patient takes Nexletol 3x/week rather  than daily  Patient discontinued lisinopril and has been managing BP conservatively with diet and exercise  Continue current medical Rx - New Rx for Nexletol 3x/week dosing sent to pharmacy  F/U 1 year          Scribe Attestation  By signing my name below, IDanica Scribe   attest that this documentation has been prepared under the direction and in the presence of Kwame Handy MD.

## 2024-11-14 DIAGNOSIS — E78.5 DYSLIPIDEMIA: ICD-10-CM

## 2024-11-22 RX ORDER — BEMPEDOIC ACID 180 MG/1
180 TABLET, FILM COATED ORAL 3 TIMES WEEKLY
Qty: 40 TABLET | Refills: 1 | Status: SHIPPED | OUTPATIENT
Start: 2024-11-22 | End: 2025-01-21

## 2024-11-26 ENCOUNTER — LAB (OUTPATIENT)
Dept: LAB | Facility: LAB | Age: 69
End: 2024-11-26
Payer: MEDICARE

## 2024-11-26 ENCOUNTER — APPOINTMENT (OUTPATIENT)
Dept: PRIMARY CARE | Facility: CLINIC | Age: 69
End: 2024-11-26
Payer: MEDICARE

## 2024-11-26 VITALS
SYSTOLIC BLOOD PRESSURE: 136 MMHG | HEIGHT: 68 IN | WEIGHT: 138 LBS | HEART RATE: 74 BPM | DIASTOLIC BLOOD PRESSURE: 70 MMHG | BODY MASS INDEX: 20.92 KG/M2

## 2024-11-26 DIAGNOSIS — H61.20 IMPACTED CERUMEN, UNSPECIFIED LATERALITY: ICD-10-CM

## 2024-11-26 DIAGNOSIS — I10 PRIMARY HYPERTENSION: ICD-10-CM

## 2024-11-26 DIAGNOSIS — R73.03 PREDIABETES: ICD-10-CM

## 2024-11-26 DIAGNOSIS — E55.9 VITAMIN D DEFICIENCY: ICD-10-CM

## 2024-11-26 DIAGNOSIS — R51.9 MORNING HEADACHE: ICD-10-CM

## 2024-11-26 DIAGNOSIS — K22.70 BARRETT'S ESOPHAGUS WITHOUT DYSPLASIA: ICD-10-CM

## 2024-11-26 DIAGNOSIS — Z12.5 SCREENING FOR PROSTATE CANCER: ICD-10-CM

## 2024-11-26 DIAGNOSIS — E78.5 DYSLIPIDEMIA: ICD-10-CM

## 2024-11-26 DIAGNOSIS — M54.40 LOW BACK PAIN WITH SCIATICA, SCIATICA LATERALITY UNSPECIFIED, UNSPECIFIED BACK PAIN LATERALITY, UNSPECIFIED CHRONICITY: Primary | ICD-10-CM

## 2024-11-26 PROBLEM — I72.1 PSEUDOANEURYSM OF ARTERY OF UPPER EXTREMITY: Status: RESOLVED | Noted: 2022-11-30 | Resolved: 2024-11-26

## 2024-11-26 LAB
25(OH)D3 SERPL-MCNC: 34 NG/ML (ref 30–100)
EST. AVERAGE GLUCOSE BLD GHB EST-MCNC: 117 MG/DL
HBA1C MFR BLD: 5.7 %
PSA SERPL-MCNC: 0.57 NG/ML
TSH SERPL-ACNC: 3.01 MIU/L (ref 0.44–3.98)
VIT B12 SERPL-MCNC: 295 PG/ML (ref 211–911)

## 2024-11-26 PROCEDURE — 36415 COLL VENOUS BLD VENIPUNCTURE: CPT

## 2024-11-26 PROCEDURE — 3075F SYST BP GE 130 - 139MM HG: CPT | Performed by: STUDENT IN AN ORGANIZED HEALTH CARE EDUCATION/TRAINING PROGRAM

## 2024-11-26 PROCEDURE — 1160F RVW MEDS BY RX/DR IN RCRD: CPT | Performed by: STUDENT IN AN ORGANIZED HEALTH CARE EDUCATION/TRAINING PROGRAM

## 2024-11-26 PROCEDURE — G0103 PSA SCREENING: HCPCS

## 2024-11-26 PROCEDURE — 82306 VITAMIN D 25 HYDROXY: CPT

## 2024-11-26 PROCEDURE — 1159F MED LIST DOCD IN RCRD: CPT | Performed by: STUDENT IN AN ORGANIZED HEALTH CARE EDUCATION/TRAINING PROGRAM

## 2024-11-26 PROCEDURE — 84443 ASSAY THYROID STIM HORMONE: CPT

## 2024-11-26 PROCEDURE — 99214 OFFICE O/P EST MOD 30 MIN: CPT | Performed by: STUDENT IN AN ORGANIZED HEALTH CARE EDUCATION/TRAINING PROGRAM

## 2024-11-26 PROCEDURE — 3078F DIAST BP <80 MM HG: CPT | Performed by: STUDENT IN AN ORGANIZED HEALTH CARE EDUCATION/TRAINING PROGRAM

## 2024-11-26 PROCEDURE — 83036 HEMOGLOBIN GLYCOSYLATED A1C: CPT

## 2024-11-26 PROCEDURE — 3008F BODY MASS INDEX DOCD: CPT | Performed by: STUDENT IN AN ORGANIZED HEALTH CARE EDUCATION/TRAINING PROGRAM

## 2024-11-26 PROCEDURE — 82607 VITAMIN B-12: CPT

## 2024-11-26 PROCEDURE — 1036F TOBACCO NON-USER: CPT | Performed by: STUDENT IN AN ORGANIZED HEALTH CARE EDUCATION/TRAINING PROGRAM

## 2024-11-26 RX ORDER — GABAPENTIN 300 MG/1
300 CAPSULE ORAL NIGHTLY
Qty: 90 CAPSULE | Refills: 1 | Status: SHIPPED | OUTPATIENT
Start: 2024-11-26 | End: 2025-05-25

## 2024-11-26 NOTE — PROGRESS NOTES
"Eugene Patrick is a 69 y.o. year old male presenting for New Patient Visit       HPI:  1. Dyslipidemia   He currently sees Dr. Handy who prescribed Nexletol 180 mg every other day.  His most recent lipid panel was within normal lipids.  He continues to monitor his diet and increase his overall exercise.     2. Hypertension   He has a history of elevated blood pressure and continues to monitor it at home, where it is regularly 120/70.  He is not on medication for his blood pressure, but has continued to make lifestyle changes.     3. Prediabetes   He previously had an elevated HbA1c; therefore, he continues to monitor his sugar at home, where it is regularly in the 80s.  He is not on medication, but has continued to make lifestyle changes.     4. Cordova's Esophagus   He has a history of GERD and Cordova's esophagus.  He is currently taking Prilosec 40 mg daily.  He previously has been getting routine endoscopy's due to his history of GERD and job as a .  His last EGD was done in October 2022 with a 3 year clearance.     5. Chronic Nerve Pain   He is currently taking gabapentin 300 mg for chronic nerve pain.  He was initially seen by dermatology for an abnormal nodule on his upper back.  The nodule was removed and found to be benign; however, he continued to have a tingling sensation in his back.  He describes the sensation as a \"bug bite\".  Denies fatigue or memory loss related to the gabapentin usage.     6. Cerumen Impaction   He has a history of bilateral cerumen impaction.  He notes that when this occurs he has increased pressure in his ears, without any reduction in hearing.  He is interested in having his ears looked at today.     7. Headache   For the past few months, he has noticed a right sided headache in the back of his head occasionally when he wakes up in the mornings.  He describes the pain as localized, dull and achey.  He has taken dual action tylenol, which has provided relief.  He is " "up to date on his eye exams, but does not a decrease in night time driving due to glares.  Denies changes in vision, jaw pain, and dizziness.     ROS:   All pertinent positive symptoms are included in history of present illness.    All other systems have been reviewed and are negative and noncontributory to this patient's current ailments.    Current Outpatient Medications   Medication Sig Dispense Refill    bempedoic acid (Nexletol) 180 mg tablet Take 180 mg by mouth 3 times a week. TAKE 1 TABLET BY MOUTH THREE TIMES A WEEK 40 tablet 1    ezetimibe (Zetia) 10 mg tablet Take 1 tablet (10 mg) by mouth once daily. 90 tablet 3    omeprazole (PriLOSEC) 40 mg DR capsule Take 1 capsule (40 mg) by mouth once daily in the morning. Take before meals. 90 capsule 3    gabapentin (Neurontin) 300 mg capsule Take 1 capsule (300 mg) by mouth once daily at bedtime. 90 capsule 1    nitroglycerin (Nitrostat) 0.4 mg SL tablet Place 1 tablet (0.4 mg) under the tongue. TAKE ONE (1) TABLET UNDER THE TONGUE FOUR (4) TIMES A DAY - KEEP IN ORIGINAL GLASS BOTTLE       No current facility-administered medications for this visit.       OBJECTIVE  Visit Vitals  /70   Pulse 74   Ht 1.727 m (5' 8\")   Wt 62.6 kg (138 lb)   BMI 20.98 kg/m²   Smoking Status Never   BSA 1.73 m²        Physical Exam:  GENERAL: Alert, oriented, pleasant, in no acute distress  HEENT: Head normocephalic, atraumatic  CV: Heart with regular rate and rhythm, normal S1/S2, no murmurs; normal peripheral pulses- radial and dorsalis pedis palpable  LUNGS: CTAB without wheezing, rhonchi or rales; good respiratory effort, no increased work of breathing  ABDOMEN: soft, non-tender, non-distended, no masses appreciated; +BS in all four quadrants  EXTREMITIES: no edema, no cyanosis  PSYCH: Appropriate mood and affect  SKIN: No rashes or lesions appreciated    Assessment/Plan   Diagnoses and all orders for this visit:  Low back pain with sciatica, sciatica laterality " unspecified, unspecified back pain laterality, unspecified chronicity  - Stable, no changes to medication recommended.     Morning headache  - Continue supportive care, including tylenol as needed.  If your headache worsens or continues, please send our office a message.     Vitamin D deficiency  - Vitamin D level was ordered per your request.   -     Vitamin D 25-Hydroxy,Total (for eval of Vitamin D levels); Future    Primary hypertension  - Continue to monitor your blood pressure at home, as well as, make life style changes.    -     Vitamin B12; Future  -     TSH with reflex to Free T4 if abnormal; Future    Prediabetes  - HbA1c was ordered per your request.    -     Hemoglobin A1C; Future  -     Vitamin B12; Future  -     TSH with reflex to Free T4 if abnormal; Future    Screening for prostate cancer  - PSA level was ordered per your request.   -     Prostate Spec.Ag,Screen; Future    Dyslipidemia  - Continue to follow with cardiology per protocol.      Cordova's esophagus without dysplasia  - Stable, no changes to medication recommended.   - Your previous EGD was done in October 2022 with a 3 year clearance.  Therefore you are due in October 2025.     Impacted cerumen, unspecified laterality  - Your ears were clear today.  Please send our office a message if you note increased pressure within your ears.

## 2024-12-05 ENCOUNTER — APPOINTMENT (OUTPATIENT)
Dept: PRIMARY CARE | Facility: CLINIC | Age: 69
End: 2024-12-05
Payer: MEDICARE

## 2024-12-09 ENCOUNTER — TELEPHONE (OUTPATIENT)
Dept: PRIMARY CARE | Facility: CLINIC | Age: 69
End: 2024-12-09
Payer: MEDICARE

## 2024-12-09 DIAGNOSIS — H53.9 VISUAL CHANGES: ICD-10-CM

## 2024-12-09 DIAGNOSIS — R51.9 NEW ONSET OF HEADACHES AFTER AGE 50: Primary | ICD-10-CM

## 2024-12-09 NOTE — TELEPHONE ENCOUNTER
Pt update : was told to call back    Patient migraines are getting worse and now seem to be affecting his vision.

## 2024-12-24 ENCOUNTER — HOSPITAL ENCOUNTER (OUTPATIENT)
Dept: RADIOLOGY | Facility: HOSPITAL | Age: 69
Discharge: HOME | End: 2024-12-24
Payer: MEDICARE

## 2024-12-24 DIAGNOSIS — R51.9 NEW ONSET OF HEADACHES AFTER AGE 50: ICD-10-CM

## 2024-12-24 DIAGNOSIS — H53.9 VISUAL CHANGES: ICD-10-CM

## 2024-12-24 PROCEDURE — 70551 MRI BRAIN STEM W/O DYE: CPT | Performed by: RADIOLOGY

## 2024-12-24 PROCEDURE — 70551 MRI BRAIN STEM W/O DYE: CPT

## 2024-12-24 NOTE — RESULT ENCOUNTER NOTE
The MRI of your brain shows no signs of an acute stroke or any masses, and there is no shift in the brain. There are mild, chronic changes in the small blood vessels of the brain, which are often seen with aging or certain risk factors, but these changes are not unusual and do not suggest an immediate concern. Your sinuses and mastoid air cells appear normal as well.

## 2025-02-04 ENCOUNTER — TELEPHONE (OUTPATIENT)
Dept: CARDIOLOGY | Facility: HOSPITAL | Age: 70
End: 2025-02-04
Payer: MEDICARE

## 2025-02-04 NOTE — TELEPHONE ENCOUNTER
Spoke to patient. States he has been getting the Nexletol without issue.     Also, since stopping Lisinopril his BP has been good. He checks a few times a week and is typically around 120/60.     No further questions or concerns at this time.

## 2025-02-04 NOTE — TELEPHONE ENCOUNTER
----- Message from Nurse Nuria LI sent at 1/30/2025  8:02 PM EST -----  Regarding: Call him to see if he was able to get his Nexletol  Call him to see if he was able to get his Nexletol.  Unclear if he needed/got a prior authorization.

## 2025-03-22 DIAGNOSIS — K22.70 BARRETT'S ESOPHAGUS WITHOUT DYSPLASIA: ICD-10-CM

## 2025-03-24 RX ORDER — OMEPRAZOLE 40 MG/1
40 CAPSULE, DELAYED RELEASE ORAL
Qty: 90 CAPSULE | Refills: 3 | Status: SHIPPED | OUTPATIENT
Start: 2025-03-24

## 2025-03-25 ENCOUNTER — APPOINTMENT (OUTPATIENT)
Dept: PRIMARY CARE | Facility: CLINIC | Age: 70
End: 2025-03-25
Payer: MEDICARE

## 2025-03-25 VITALS
OXYGEN SATURATION: 97 % | HEART RATE: 60 BPM | WEIGHT: 149 LBS | DIASTOLIC BLOOD PRESSURE: 66 MMHG | HEIGHT: 68 IN | BODY MASS INDEX: 22.58 KG/M2 | SYSTOLIC BLOOD PRESSURE: 110 MMHG

## 2025-03-25 DIAGNOSIS — R73.03 PREDIABETES: ICD-10-CM

## 2025-03-25 DIAGNOSIS — Z00.00 ENCOUNTER FOR ANNUAL WELLNESS EXAM IN MEDICARE PATIENT: Primary | ICD-10-CM

## 2025-03-25 DIAGNOSIS — I10 PRIMARY HYPERTENSION: ICD-10-CM

## 2025-03-25 DIAGNOSIS — K22.70 BARRETT'S ESOPHAGUS WITHOUT DYSPLASIA: ICD-10-CM

## 2025-03-25 DIAGNOSIS — Z12.5 PROSTATE CANCER SCREENING: ICD-10-CM

## 2025-03-25 DIAGNOSIS — E78.5 DYSLIPIDEMIA: ICD-10-CM

## 2025-03-25 DIAGNOSIS — G43.119 INTRACTABLE MIGRAINE WITH AURA WITHOUT STATUS MIGRAINOSUS: ICD-10-CM

## 2025-03-25 PROBLEM — S67.21XA CRUSHING INJURY OF RIGHT HAND: Status: RESOLVED | Noted: 2023-05-17 | Resolved: 2025-03-25

## 2025-03-25 PROBLEM — B02.9 HERPES ZOSTER: Status: RESOLVED | Noted: 2023-05-17 | Resolved: 2025-03-25

## 2025-03-25 PROBLEM — H61.20 IMPACTED CERUMEN: Status: RESOLVED | Noted: 2024-05-17 | Resolved: 2025-03-25

## 2025-03-25 PROBLEM — S60.229A CONTUSION OF HAND: Status: RESOLVED | Noted: 2023-05-17 | Resolved: 2025-03-25

## 2025-03-25 PROBLEM — M25.532 LEFT WRIST PAIN: Status: RESOLVED | Noted: 2023-05-17 | Resolved: 2025-03-25

## 2025-03-25 PROBLEM — R06.2 WHEEZING: Status: RESOLVED | Noted: 2023-05-17 | Resolved: 2025-03-25

## 2025-03-25 PROBLEM — R93.89 ABNORMAL X-RAY EXAMINATION: Status: RESOLVED | Noted: 2023-05-17 | Resolved: 2025-03-25

## 2025-03-25 PROBLEM — Z01.818 PRE-OP EVALUATION: Status: RESOLVED | Noted: 2023-12-07 | Resolved: 2025-03-25

## 2025-03-25 PROBLEM — R05.9 COUGH, UNSPECIFIED: Status: RESOLVED | Noted: 2022-09-05 | Resolved: 2025-03-25

## 2025-03-25 PROBLEM — H61.21 IMPACTED CERUMEN OF RIGHT EAR: Status: RESOLVED | Noted: 2023-05-17 | Resolved: 2025-03-25

## 2025-03-25 PROBLEM — R53.83 FATIGUE: Status: RESOLVED | Noted: 2023-05-17 | Resolved: 2025-03-25

## 2025-03-25 PROBLEM — R22.31 LOCALIZED SWELLING ON RIGHT HAND: Status: RESOLVED | Noted: 2024-05-17 | Resolved: 2025-03-25

## 2025-03-25 PROBLEM — U07.1 DISEASE DUE TO SEVERE ACUTE RESPIRATORY SYNDROME CORONAVIRUS 2 (SARS-COV-2): Status: RESOLVED | Noted: 2022-09-05 | Resolved: 2025-03-25

## 2025-03-25 PROCEDURE — 1036F TOBACCO NON-USER: CPT | Performed by: STUDENT IN AN ORGANIZED HEALTH CARE EDUCATION/TRAINING PROGRAM

## 2025-03-25 PROCEDURE — 3078F DIAST BP <80 MM HG: CPT | Performed by: STUDENT IN AN ORGANIZED HEALTH CARE EDUCATION/TRAINING PROGRAM

## 2025-03-25 PROCEDURE — 1158F ADVNC CARE PLAN TLK DOCD: CPT | Performed by: STUDENT IN AN ORGANIZED HEALTH CARE EDUCATION/TRAINING PROGRAM

## 2025-03-25 PROCEDURE — G0439 PPPS, SUBSEQ VISIT: HCPCS | Performed by: STUDENT IN AN ORGANIZED HEALTH CARE EDUCATION/TRAINING PROGRAM

## 2025-03-25 PROCEDURE — 3074F SYST BP LT 130 MM HG: CPT | Performed by: STUDENT IN AN ORGANIZED HEALTH CARE EDUCATION/TRAINING PROGRAM

## 2025-03-25 PROCEDURE — 1123F ACP DISCUSS/DSCN MKR DOCD: CPT | Performed by: STUDENT IN AN ORGANIZED HEALTH CARE EDUCATION/TRAINING PROGRAM

## 2025-03-25 PROCEDURE — 1159F MED LIST DOCD IN RCRD: CPT | Performed by: STUDENT IN AN ORGANIZED HEALTH CARE EDUCATION/TRAINING PROGRAM

## 2025-03-25 PROCEDURE — 1160F RVW MEDS BY RX/DR IN RCRD: CPT | Performed by: STUDENT IN AN ORGANIZED HEALTH CARE EDUCATION/TRAINING PROGRAM

## 2025-03-25 PROCEDURE — 99214 OFFICE O/P EST MOD 30 MIN: CPT | Performed by: STUDENT IN AN ORGANIZED HEALTH CARE EDUCATION/TRAINING PROGRAM

## 2025-03-25 PROCEDURE — 1170F FXNL STATUS ASSESSED: CPT | Performed by: STUDENT IN AN ORGANIZED HEALTH CARE EDUCATION/TRAINING PROGRAM

## 2025-03-25 PROCEDURE — 3008F BODY MASS INDEX DOCD: CPT | Performed by: STUDENT IN AN ORGANIZED HEALTH CARE EDUCATION/TRAINING PROGRAM

## 2025-03-25 RX ORDER — EZETIMIBE 10 MG/1
10 TABLET ORAL DAILY
Qty: 90 TABLET | Refills: 3 | Status: SHIPPED | OUTPATIENT
Start: 2025-03-25 | End: 2026-03-20

## 2025-03-25 ASSESSMENT — ANXIETY QUESTIONNAIRES
1. FEELING NERVOUS, ANXIOUS, OR ON EDGE: NOT AT ALL
GAD7 TOTAL SCORE: 0
6. BECOMING EASILY ANNOYED OR IRRITABLE: NOT AT ALL
3. WORRYING TOO MUCH ABOUT DIFFERENT THINGS: NOT AT ALL
7. FEELING AFRAID AS IF SOMETHING AWFUL MIGHT HAPPEN: NOT AT ALL
2. NOT BEING ABLE TO STOP OR CONTROL WORRYING: NOT AT ALL
5. BEING SO RESTLESS THAT IT IS HARD TO SIT STILL: NOT AT ALL
IF YOU CHECKED OFF ANY PROBLEMS ON THIS QUESTIONNAIRE, HOW DIFFICULT HAVE THESE PROBLEMS MADE IT FOR YOU TO DO YOUR WORK, TAKE CARE OF THINGS AT HOME, OR GET ALONG WITH OTHER PEOPLE: NOT DIFFICULT AT ALL
4. TROUBLE RELAXING: NOT AT ALL

## 2025-03-25 ASSESSMENT — ENCOUNTER SYMPTOMS
OCCASIONAL FEELINGS OF UNSTEADINESS: 0
DEPRESSION: 0
LOSS OF SENSATION IN FEET: 0

## 2025-03-25 ASSESSMENT — PATIENT HEALTH QUESTIONNAIRE - PHQ9
10. IF YOU CHECKED OFF ANY PROBLEMS, HOW DIFFICULT HAVE THESE PROBLEMS MADE IT FOR YOU TO DO YOUR WORK, TAKE CARE OF THINGS AT HOME, OR GET ALONG WITH OTHER PEOPLE: NOT DIFFICULT AT ALL
SUM OF ALL RESPONSES TO PHQ QUESTIONS 1-9: 0
3. TROUBLE FALLING OR STAYING ASLEEP OR SLEEPING TOO MUCH: NOT AT ALL
1. LITTLE INTEREST OR PLEASURE IN DOING THINGS: NOT AT ALL
6. FEELING BAD ABOUT YOURSELF - OR THAT YOU ARE A FAILURE OR HAVE LET YOURSELF OR YOUR FAMILY DOWN: NOT AT ALL
2. FEELING DOWN, DEPRESSED OR HOPELESS: NOT AT ALL
4. FEELING TIRED OR HAVING LITTLE ENERGY: NOT AT ALL
8. MOVING OR SPEAKING SO SLOWLY THAT OTHER PEOPLE COULD HAVE NOTICED. OR THE OPPOSITE, BEING SO FIGETY OR RESTLESS THAT YOU HAVE BEEN MOVING AROUND A LOT MORE THAN USUAL: NOT AT ALL
9. THOUGHTS THAT YOU WOULD BE BETTER OFF DEAD, OR OF HURTING YOURSELF: NOT AT ALL
7. TROUBLE CONCENTRATING ON THINGS, SUCH AS READING THE NEWSPAPER OR WATCHING TELEVISION: NOT AT ALL
5. POOR APPETITE OR OVEREATING: NOT AT ALL
SUM OF ALL RESPONSES TO PHQ9 QUESTIONS 1 AND 2: 0

## 2025-03-25 ASSESSMENT — ACTIVITIES OF DAILY LIVING (ADL)
GROCERY_SHOPPING: INDEPENDENT
BATHING: INDEPENDENT
TAKING_MEDICATION: INDEPENDENT
MANAGING_FINANCES: INDEPENDENT
DOING_HOUSEWORK: INDEPENDENT
DRESSING: INDEPENDENT

## 2025-03-25 NOTE — ASSESSMENT & PLAN NOTE
Stable and well controlled. Continue current Nexletol 180 mg every other day (M/W/F) as prescribed by Dr. Handy in Cardiology.   Also, continue current Zetia 10 mg daily with refills sent to pt's pharmacy.   Orders:    ezetimibe (Zetia) 10 mg tablet; Take 1 tablet (10 mg) by mouth once daily.    Comprehensive Metabolic Panel; Future

## 2025-03-25 NOTE — ASSESSMENT & PLAN NOTE
Well managed. Continue taking omeprazole 40 mg daily with refills sent to pt's pharmacy.   Advised resuming B12 supplement given chronic, daily PPI use and its effect on B12 absorption.

## 2025-03-25 NOTE — PROGRESS NOTES
Subjective   Reason for Visit: Eugene Patrick is an 70 y.o. male here for a Medicare Wellness visit.     Past Medical, Surgical, and Family History reviewed and updated in chart.    Reviewed all medications by prescribing practitioner or clinical pharmacist (such as prescriptions, OTCs, herbal therapies and supplements) and documented in the medical record.    [facility]  Health Maintenance/Medicare annual wellness  Overall patient is doing well.   Immunization: Deffered both RSV and Prevnar 20 (pt defers both). Otherwise UTD on others   Colon Cancer Screening: UTD: Colonoscopy (2/1/2023) normal; 10-year repeat will be due 2033.   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   Pt is not fasting today.     2. Dyslipidemia   Controlled per last lipid panel (11/8/2024): cholesterol 146, LDL 79, HDL 47.5, and TG 96.   Followed by Dr. Handy who prescribed Nexletol 180 mg every other day.   Pt also taking Zetia 10 mg daily. He continues to monitor his diet and increase his overall exercise.      3. Hypertension   Controlled with BP reading of 110/66 today.   He is not on medication for his blood pressure, but has continued to make lifestyle changes.      4. Prediabetes   Improved from prior. Downtrend in HbA1c from 6.2 to 5.7 on 11/26/2024.   Continues to monitor his sugar at home, where it is regularly in the 80s.    He is not on medication, but has continued to make lifestyle changes.      5. Cordova's Esophagus   He has a history of GERD and Cordova's esophagus.  He is currently taking Prilosec 40 mg daily.  He previously has been getting routine endoscopy's due to his history of GERD and job as a .  His last EGD was done in October 2022 with a 3 year clearance.      6.  "Chronic Nerve Pain   Well managed with pt taking gabapentin 300 mg for chronic nerve pain.    No acute concerns reported today.     7. Migraine with aura  Subsequent encounter with continued R sided, pounding HA every 1-2 weeks last several hours.   New sx of visual disturbance twice since last visit, described as \"twinkling\" in peripheral vision.   Denies sensitivity to light/sound, eye pain, dizziness, nausea, or vomitting.   Brain MRI (12/24/2024) negative for acute intracranial process.   Pain typically wakes pt from his sleep though has occurred twice in the evening.   Pt has not been able to identify any triggers limits caffeine, stays hydrated, denies new stressors, and typically sleeps soundly.   Thought dark chocolate to be a trigger but had a migraine even without eating chocolate.   Suspects high chlorine content in the pool he swims at could be a trigger but is unsure.   Pain is well managed with Tylenol.        Patient Self Assessment of Health Status  Patient Self Assessment: Good    Nutrition and Exercise  Current Diet: Well Balanced Diet  Adequate Fluid Intake: Yes  Caffeine: Yes  Exercise Frequency: Regularly    Functional Ability/Level of Safety  Cognitive Impairment Observed: No cognitive impairment observed  Cognitive Impairment Reported: No cognitive impairment reported by patient or family    Home Safety Risk Factors: None    Patient Care Team:  Barbara Gilman DO as PCP - General (Family Medicine)     Review of Systems  All pertinent positive symptoms are included in history of present illness.    All other systems have been reviewed and are negative and noncontributory to this patient's current ailments.    Objective   Vitals:  /66   Pulse 60   Ht 1.727 m (5' 8\")   Wt 67.6 kg (149 lb)   SpO2 97%   BMI 22.66 kg/m²       Physical Exam  CONSTITUTIONAL - INAD. Not ill appearing.  SKIN - No lesions or rashes visualized. Good skin turgor.  HEAD - Atraumatic, normocephalic.  RESP - " CTAB. No wheezing, rhonchi, or crackles.   CARDIAC - RRR. No murmurs, gallops, or rubs.  ABDOMEN - Soft, nontender, nondistended. No organomegaly.   PSYCH - Appropriate mood and affect.  EXT: No edema present    Assessment/Plan     Assessment & Plan  Encounter for annual wellness exam in Medicare patient  A complete history and physical was performed today.  Deferred both RSV and Prevnar 20 with vaccines otherwise UTD  Colon cancer screening is up to date.  Fasting labs ordered today include:         Cordova's esophagus without dysplasia  Well managed. Continue taking omeprazole 40 mg daily with refills sent to pt's pharmacy.   Advised resuming B12 supplement given chronic, daily PPI use and its effect on B12 absorption.        Dyslipidemia  Stable and well controlled. Continue current Nexletol 180 mg every other day (M/W/F) as prescribed by Dr. Handy in Cardiology.   Also, continue current Zetia 10 mg daily with refills sent to pt's pharmacy.   Orders:    ezetimibe (Zetia) 10 mg tablet; Take 1 tablet (10 mg) by mouth once daily.    Comprehensive Metabolic Panel; Future    Primary hypertension  Well controlled. Normotensive today (/66).   Continue with lifestyle modification.   Orders:    Albumin-Creatinine Ratio, Urine Random; Future    Prediabetes  Improved from prior. Downtrend in HbA1c from 6.2 to 5.7 on 11/26/2024.   Continue with lifestyle modification.   Orders:    Hemoglobin A1C; Future    Prostate cancer screening  Ordered routine PSA screening lab.   We will notify of test results once available and make treatment recommendations accordingly  Orders:    Prostate Specific Antigen; Future    Intractable migraine with aura without status migrainosus  Ongoing without acute changes in frequency but new characteristic of aura since prior visit.   At length discussion of migraine vs HA and potential triggers.   Encouraged pt to continue trying to identify specific trigger with expectation there may not be  one.   Reviewed negative Brain MRI (12/24/2024) negative for acute intracranial process.  Advised taking Tylenol/NSAID PRN for migraine, trying to avoid triggers (if any), stress, and to get plenty of sleep.   Advised pt to visit ED immediately if he develops any red flag sx's of CVA or MI including CP, sudden SOB, asymmetrical weakness, confusion, slurred speech, sudden loss of vision, and sudden/severe HA outside his typical migraine among others.        Pt to follow-up in clinic in 6 months to review labs and medication refill or sooner as needed.

## 2025-03-25 NOTE — ASSESSMENT & PLAN NOTE
Well controlled. Normotensive today (/66).   Continue with lifestyle modification.   Orders:    Albumin-Creatinine Ratio, Urine Random; Future

## 2025-03-25 NOTE — ASSESSMENT & PLAN NOTE
Improved from prior. Downtrend in HbA1c from 6.2 to 5.7 on 11/26/2024.   Continue with lifestyle modification.   Orders:    Hemoglobin A1C; Future

## 2025-03-28 ENCOUNTER — PATIENT MESSAGE (OUTPATIENT)
Dept: PRIMARY CARE | Facility: CLINIC | Age: 70
End: 2025-03-28
Payer: MEDICARE

## 2025-03-28 LAB
ALBUMIN SERPL-MCNC: 4.4 G/DL (ref 3.6–5.1)
ALBUMIN/CREAT UR: 6 MG/G CREAT
ALP SERPL-CCNC: 46 U/L (ref 35–144)
ALT SERPL-CCNC: 14 U/L (ref 9–46)
ANION GAP SERPL CALCULATED.4IONS-SCNC: 8 MMOL/L (CALC) (ref 7–17)
AST SERPL-CCNC: 16 U/L (ref 10–35)
BILIRUB SERPL-MCNC: 0.5 MG/DL (ref 0.2–1.2)
BUN SERPL-MCNC: 22 MG/DL (ref 7–25)
CALCIUM SERPL-MCNC: 9.2 MG/DL (ref 8.6–10.3)
CHLORIDE SERPL-SCNC: 105 MMOL/L (ref 98–110)
CO2 SERPL-SCNC: 30 MMOL/L (ref 20–32)
CREAT SERPL-MCNC: 0.87 MG/DL (ref 0.7–1.35)
CREAT UR-MCNC: 156 MG/DL (ref 20–320)
EGFRCR SERPLBLD CKD-EPI 2021: 94 ML/MIN/1.73M2
EST. AVERAGE GLUCOSE BLD GHB EST-MCNC: 126 MG/DL
EST. AVERAGE GLUCOSE BLD GHB EST-SCNC: 7 MMOL/L
GLUCOSE SERPL-MCNC: 98 MG/DL (ref 65–99)
HBA1C MFR BLD: 6 % OF TOTAL HGB
MICROALBUMIN UR-MCNC: 0.9 MG/DL
POTASSIUM SERPL-SCNC: 4.2 MMOL/L (ref 3.5–5.3)
PROT SERPL-MCNC: 6.5 G/DL (ref 6.1–8.1)
PSA SERPL-MCNC: 0.45 NG/ML
SODIUM SERPL-SCNC: 143 MMOL/L (ref 135–146)

## 2025-05-12 ENCOUNTER — APPOINTMENT (OUTPATIENT)
Dept: PRIMARY CARE | Facility: CLINIC | Age: 70
End: 2025-05-12
Payer: MEDICARE

## 2025-05-12 ENCOUNTER — TELEPHONE (OUTPATIENT)
Dept: PRIMARY CARE | Facility: CLINIC | Age: 70
End: 2025-05-12

## 2025-05-12 VITALS
HEART RATE: 62 BPM | WEIGHT: 149 LBS | HEIGHT: 68 IN | BODY MASS INDEX: 22.58 KG/M2 | SYSTOLIC BLOOD PRESSURE: 124 MMHG | DIASTOLIC BLOOD PRESSURE: 80 MMHG

## 2025-05-12 DIAGNOSIS — Z91.89 AT HIGH RISK FOR OSTEOPOROSIS: ICD-10-CM

## 2025-05-12 DIAGNOSIS — Z13.820 SCREENING FOR OSTEOPOROSIS: ICD-10-CM

## 2025-05-12 DIAGNOSIS — R29.91: Primary | ICD-10-CM

## 2025-05-12 DIAGNOSIS — Z79.899 LONG-TERM CURRENT USE OF PROTON PUMP INHIBITOR THERAPY: ICD-10-CM

## 2025-05-12 PROCEDURE — 1160F RVW MEDS BY RX/DR IN RCRD: CPT | Performed by: STUDENT IN AN ORGANIZED HEALTH CARE EDUCATION/TRAINING PROGRAM

## 2025-05-12 PROCEDURE — 3079F DIAST BP 80-89 MM HG: CPT | Performed by: STUDENT IN AN ORGANIZED HEALTH CARE EDUCATION/TRAINING PROGRAM

## 2025-05-12 PROCEDURE — 1159F MED LIST DOCD IN RCRD: CPT | Performed by: STUDENT IN AN ORGANIZED HEALTH CARE EDUCATION/TRAINING PROGRAM

## 2025-05-12 PROCEDURE — 3074F SYST BP LT 130 MM HG: CPT | Performed by: STUDENT IN AN ORGANIZED HEALTH CARE EDUCATION/TRAINING PROGRAM

## 2025-05-12 PROCEDURE — 99212 OFFICE O/P EST SF 10 MIN: CPT | Performed by: STUDENT IN AN ORGANIZED HEALTH CARE EDUCATION/TRAINING PROGRAM

## 2025-05-12 PROCEDURE — 3008F BODY MASS INDEX DOCD: CPT | Performed by: STUDENT IN AN ORGANIZED HEALTH CARE EDUCATION/TRAINING PROGRAM

## 2025-05-12 PROCEDURE — 1036F TOBACCO NON-USER: CPT | Performed by: STUDENT IN AN ORGANIZED HEALTH CARE EDUCATION/TRAINING PROGRAM

## 2025-05-12 NOTE — TELEPHONE ENCOUNTER
Order for bone density is not covered with the current DX  Is there another one we can use possibly?

## 2025-05-12 NOTE — PROGRESS NOTES
"Eugene Patrick is a 70 y.o. year old male presenting for Request For Order(s)       HPI:  He is presenting today requesting a bone density test due to an abnormal screening he had with his insurance.  On this screening it stated that he is at high risk for osteoporosis based on answers he put into a survey.  He has no smoking history, no long-term steroid use, no history of COPD or asthma, no autoimmune diseases such as inflammatory bowel disease.  He does take omeprazole daily for many years now, which could be a risk factor.    ROS:   All pertinent positive symptoms are included in history of present illness.    All other systems have been reviewed and are negative and noncontributory to this patient's current ailments.    Current Medications[1]    OBJECTIVE  Visit Vitals  /80   Pulse 62   Ht 1.727 m (5' 8\")   Wt 67.6 kg (149 lb)   BMI 22.66 kg/m²   Smoking Status Never   BSA 1.8 m²        Physical Exam:  GENERAL: Alert, oriented, pleasant, in no acute distress  HEENT: Head normocephalic, atraumatic  EXTREMITIES: no edema, no cyanosis  PSYCH: Appropriate mood and affect  SKIN: No rashes or lesions appreciated    Assessment/Plan   Diagnoses and all orders for this visit:  Abnormal musculoskeletal test  -     XR DEXA bone density; Future  Screening for osteoporosis  -     XR DEXA bone density; Future  I did warn him that Medicare may not cover this for him because he seems to be low risk in my opinion for a male.  He is thin with long-term PPI use and these are the only risk factors I can come up with at this time.  I did order the DEXA for him to get at any time.  I did suggest he check with Medicare to see if it would be covered.                [1]   Current Outpatient Medications   Medication Sig Dispense Refill    bempedoic acid (Nexletol) 180 mg tablet Take 180 mg by mouth 3 times a week. TAKE 1 TABLET BY MOUTH THREE TIMES A WEEK 40 tablet 1    ezetimibe (Zetia) 10 mg tablet Take 1 tablet (10 mg) by " mouth once daily. 90 tablet 3    gabapentin (Neurontin) 300 mg capsule Take 1 capsule (300 mg) by mouth once daily at bedtime. 90 capsule 1    nitroglycerin (Nitrostat) 0.4 mg SL tablet Place 1 tablet (0.4 mg) under the tongue. TAKE ONE (1) TABLET UNDER THE TONGUE FOUR (4) TIMES A DAY - KEEP IN ORIGINAL GLASS BOTTLE      omeprazole (PriLOSEC) 40 mg DR capsule TAKE 1 CAPSULE (40 MG) BY MOUTH ONCE DAILY IN THE MORNING. TAKE BEFORE MEALS. 90 capsule 3     No current facility-administered medications for this visit.

## 2025-05-14 ENCOUNTER — APPOINTMENT (OUTPATIENT)
Dept: RADIOLOGY | Facility: CLINIC | Age: 70
End: 2025-05-14
Payer: MEDICARE

## 2025-05-27 ENCOUNTER — PATIENT MESSAGE (OUTPATIENT)
Dept: PRIMARY CARE | Facility: CLINIC | Age: 70
End: 2025-05-27
Payer: MEDICARE

## 2025-05-27 DIAGNOSIS — M54.40 LOW BACK PAIN WITH SCIATICA, SCIATICA LATERALITY UNSPECIFIED, UNSPECIFIED BACK PAIN LATERALITY, UNSPECIFIED CHRONICITY: ICD-10-CM

## 2025-05-27 RX ORDER — GABAPENTIN 300 MG/1
300 CAPSULE ORAL NIGHTLY
Qty: 90 CAPSULE | Refills: 0 | Status: SHIPPED | OUTPATIENT
Start: 2025-05-27 | End: 2025-08-25

## 2025-06-06 ENCOUNTER — APPOINTMENT (OUTPATIENT)
Facility: CLINIC | Age: 70
End: 2025-06-06
Payer: MEDICARE

## 2025-06-16 ENCOUNTER — PATIENT MESSAGE (OUTPATIENT)
Dept: PRIMARY CARE | Facility: CLINIC | Age: 70
End: 2025-06-16
Payer: MEDICARE

## 2025-06-16 DIAGNOSIS — R51.9 CHRONIC NONINTRACTABLE HEADACHE, UNSPECIFIED HEADACHE TYPE: Primary | ICD-10-CM

## 2025-06-16 DIAGNOSIS — G89.29 CHRONIC NONINTRACTABLE HEADACHE, UNSPECIFIED HEADACHE TYPE: Primary | ICD-10-CM

## 2025-06-17 ENCOUNTER — PATIENT MESSAGE (OUTPATIENT)
Dept: PRIMARY CARE | Facility: CLINIC | Age: 70
End: 2025-06-17
Payer: MEDICARE

## 2025-06-17 DIAGNOSIS — M54.2 NECK PAIN: ICD-10-CM

## 2025-06-17 DIAGNOSIS — M54.9 UPPER BACK PAIN: ICD-10-CM

## 2025-06-17 DIAGNOSIS — R51.9 NEW ONSET OF HEADACHES AFTER AGE 50: Primary | ICD-10-CM

## 2025-06-17 DIAGNOSIS — G43.009 MIGRAINE WITHOUT AURA AND WITHOUT STATUS MIGRAINOSUS, NOT INTRACTABLE: Primary | ICD-10-CM

## 2025-06-17 LAB
CRP SERPL-MCNC: <3 MG/L
ERYTHROCYTE [SEDIMENTATION RATE] IN BLOOD BY WESTERGREN METHOD: 2 MM/H

## 2025-06-19 RX ORDER — METHOCARBAMOL 500 MG/1
500 TABLET, FILM COATED ORAL 4 TIMES DAILY PRN
Qty: 40 TABLET | Refills: 0 | Status: SHIPPED | OUTPATIENT
Start: 2025-06-19 | End: 2025-08-18

## 2025-06-20 ENCOUNTER — HOSPITAL ENCOUNTER (OUTPATIENT)
Dept: RADIOLOGY | Facility: CLINIC | Age: 70
Discharge: HOME | End: 2025-06-20
Payer: MEDICARE

## 2025-06-20 DIAGNOSIS — M54.2 NECK PAIN: ICD-10-CM

## 2025-06-20 DIAGNOSIS — M54.9 UPPER BACK PAIN: ICD-10-CM

## 2025-06-20 PROCEDURE — 72040 X-RAY EXAM NECK SPINE 2-3 VW: CPT | Performed by: RADIOLOGY

## 2025-06-20 PROCEDURE — 72070 X-RAY EXAM THORAC SPINE 2VWS: CPT

## 2025-06-20 PROCEDURE — 72070 X-RAY EXAM THORAC SPINE 2VWS: CPT | Performed by: RADIOLOGY

## 2025-06-20 PROCEDURE — 72040 X-RAY EXAM NECK SPINE 2-3 VW: CPT

## 2025-06-21 ENCOUNTER — PATIENT MESSAGE (OUTPATIENT)
Dept: PRIMARY CARE | Facility: CLINIC | Age: 70
End: 2025-06-21
Payer: MEDICARE

## 2025-06-21 DIAGNOSIS — M54.2 NECK PAIN: Primary | ICD-10-CM

## 2025-06-21 DIAGNOSIS — M54.9 UPPER BACK PAIN: ICD-10-CM

## 2025-07-28 ENCOUNTER — TELEPHONE (OUTPATIENT)
Dept: PRIMARY CARE | Facility: CLINIC | Age: 70
End: 2025-07-28
Payer: MEDICARE

## 2025-07-28 DIAGNOSIS — K22.70 BARRETT'S ESOPHAGUS WITHOUT DYSPLASIA: Primary | ICD-10-CM

## 2025-07-31 DIAGNOSIS — E78.5 DYSLIPIDEMIA: ICD-10-CM

## 2025-07-31 DIAGNOSIS — E78.5 DYSLIPIDEMIA: Primary | ICD-10-CM

## 2025-07-31 RX ORDER — BEMPEDOIC ACID 180 MG/1
180 TABLET, FILM COATED ORAL 3 TIMES WEEKLY
Qty: 40 TABLET | Refills: 0 | Status: SHIPPED | OUTPATIENT
Start: 2025-08-01 | End: 2025-07-31 | Stop reason: SDUPTHER

## 2025-08-01 RX ORDER — BEMPEDOIC ACID 180 MG/1
180 TABLET, FILM COATED ORAL 3 TIMES WEEKLY
Qty: 40 TABLET | Refills: 0 | Status: SHIPPED | OUTPATIENT
Start: 2025-08-01 | End: 2025-09-30

## 2025-08-01 NOTE — TELEPHONE ENCOUNTER
----- Message from Nurse Alexandra LANIER sent at 7/31/2025  6:06 PM EDT -----  Regarding: Refill  Refill Nexletol to [facility]. Has been sent to the wrong pharmacy. Please be sure it goes to [facility] in [place]. Patient phone [phone]

## 2025-08-11 ENCOUNTER — PATIENT MESSAGE (OUTPATIENT)
Dept: PRIMARY CARE | Facility: CLINIC | Age: 70
End: 2025-08-11
Payer: MEDICARE

## 2025-08-11 DIAGNOSIS — M54.40 LOW BACK PAIN WITH SCIATICA, SCIATICA LATERALITY UNSPECIFIED, UNSPECIFIED BACK PAIN LATERALITY, UNSPECIFIED CHRONICITY: ICD-10-CM

## 2025-08-12 RX ORDER — GABAPENTIN 100 MG/1
200 CAPSULE ORAL NIGHTLY
Qty: 180 CAPSULE | Refills: 0 | Status: SHIPPED | OUTPATIENT
Start: 2025-08-12 | End: 2025-11-10

## 2025-08-27 ENCOUNTER — PATIENT MESSAGE (OUTPATIENT)
Dept: PRIMARY CARE | Facility: CLINIC | Age: 70
End: 2025-08-27
Payer: MEDICARE

## 2025-08-27 DIAGNOSIS — M54.9 UPPER BACK PAIN: ICD-10-CM

## 2025-08-27 DIAGNOSIS — M54.2 NECK PAIN: ICD-10-CM

## 2025-08-27 RX ORDER — METHOCARBAMOL 500 MG/1
500 TABLET, FILM COATED ORAL 4 TIMES DAILY PRN
Qty: 40 TABLET | Refills: 0 | Status: SHIPPED | OUTPATIENT
Start: 2025-08-27 | End: 2025-10-26

## 2025-11-10 ENCOUNTER — APPOINTMENT (OUTPATIENT)
Dept: GASTROENTEROLOGY | Facility: EXTERNAL LOCATION | Age: 70
End: 2025-11-10
Payer: MEDICARE